# Patient Record
Sex: FEMALE | Race: BLACK OR AFRICAN AMERICAN | NOT HISPANIC OR LATINO | URBAN - METROPOLITAN AREA
[De-identification: names, ages, dates, MRNs, and addresses within clinical notes are randomized per-mention and may not be internally consistent; named-entity substitution may affect disease eponyms.]

---

## 2018-04-15 ENCOUNTER — EMERGENCY (EMERGENCY)
Facility: HOSPITAL | Age: 38
LOS: 0 days | Discharge: ANOTHER HOSPITAL | End: 2018-04-15
Attending: EMERGENCY MEDICINE
Payer: MEDICARE

## 2018-04-15 ENCOUNTER — INPATIENT (INPATIENT)
Facility: HOSPITAL | Age: 38
LOS: 9 days | Discharge: HOME | DRG: 57 | End: 2018-04-25
Attending: PSYCHIATRY & NEUROLOGY | Admitting: PSYCHIATRY & NEUROLOGY
Payer: MEDICARE

## 2018-04-15 VITALS
TEMPERATURE: 98 F | WEIGHT: 160.06 LBS | RESPIRATION RATE: 16 BRPM | OXYGEN SATURATION: 100 % | HEART RATE: 82 BPM | SYSTOLIC BLOOD PRESSURE: 117 MMHG | DIASTOLIC BLOOD PRESSURE: 78 MMHG | HEIGHT: 67 IN

## 2018-04-15 VITALS
HEART RATE: 76 BPM | SYSTOLIC BLOOD PRESSURE: 126 MMHG | RESPIRATION RATE: 18 BRPM | OXYGEN SATURATION: 100 % | DIASTOLIC BLOOD PRESSURE: 81 MMHG

## 2018-04-15 VITALS
SYSTOLIC BLOOD PRESSURE: 117 MMHG | OXYGEN SATURATION: 100 % | DIASTOLIC BLOOD PRESSURE: 84 MMHG | RESPIRATION RATE: 18 BRPM | HEART RATE: 84 BPM | TEMPERATURE: 98 F

## 2018-04-15 DIAGNOSIS — Z98.1 ARTHRODESIS STATUS: Chronic | ICD-10-CM

## 2018-04-15 DIAGNOSIS — R53.1 WEAKNESS: ICD-10-CM

## 2018-04-15 DIAGNOSIS — M25.512 PAIN IN LEFT SHOULDER: ICD-10-CM
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
## 2018-04-15 DIAGNOSIS — S14.109A UNSPECIFIED INJURY AT UNSPECIFIED LEVEL OF CERVICAL SPINAL CORD, INITIAL ENCOUNTER: ICD-10-CM
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
## 2018-04-15 DIAGNOSIS — M54.2 CERVICALGIA: ICD-10-CM

## 2018-04-15 LAB
ALBUMIN SERPL ELPH-MCNC: 3.7 G/DL — SIGNIFICANT CHANGE UP (ref 3.3–5)
ALP SERPL-CCNC: 90 U/L — SIGNIFICANT CHANGE UP (ref 40–120)
ALT FLD-CCNC: 14 U/L — SIGNIFICANT CHANGE UP (ref 12–78)
ANION GAP SERPL CALC-SCNC: 7 MMOL/L — SIGNIFICANT CHANGE UP (ref 5–17)
AST SERPL-CCNC: 19 U/L — SIGNIFICANT CHANGE UP (ref 15–37)
BASOPHILS # BLD AUTO: 0.11 K/UL — SIGNIFICANT CHANGE UP (ref 0–0.2)
BASOPHILS NFR BLD AUTO: 1.3 % — SIGNIFICANT CHANGE UP (ref 0–2)
BILIRUB SERPL-MCNC: 0.2 MG/DL — SIGNIFICANT CHANGE UP (ref 0.2–1.2)
BLD GP AB SCN SERPL QL: NEGATIVE — SIGNIFICANT CHANGE UP
BUN SERPL-MCNC: 12 MG/DL — SIGNIFICANT CHANGE UP (ref 7–23)
CALCIUM SERPL-MCNC: 8.8 MG/DL — SIGNIFICANT CHANGE UP (ref 8.5–10.1)
CHLORIDE SERPL-SCNC: 108 MMOL/L — SIGNIFICANT CHANGE UP (ref 96–108)
CO2 SERPL-SCNC: 24 MMOL/L — SIGNIFICANT CHANGE UP (ref 22–31)
CREAT SERPL-MCNC: 0.82 MG/DL — SIGNIFICANT CHANGE UP (ref 0.5–1.3)
CRP SERPL-MCNC: 0.2 MG/DL — SIGNIFICANT CHANGE UP (ref 0–0.4)
EOSINOPHIL # BLD AUTO: 0.33 K/UL — SIGNIFICANT CHANGE UP (ref 0–0.5)
EOSINOPHIL NFR BLD AUTO: 3.8 % — SIGNIFICANT CHANGE UP (ref 0–6)
GLUCOSE SERPL-MCNC: 83 MG/DL — SIGNIFICANT CHANGE UP (ref 70–99)
HCG SERPL-ACNC: <1 MIU/ML — SIGNIFICANT CHANGE UP
HCT VFR BLD CALC: 33.1 % — LOW (ref 34.5–45)
HGB BLD-MCNC: 10.1 G/DL — LOW (ref 11.5–15.5)
IMM GRANULOCYTES NFR BLD AUTO: 0.1 % — SIGNIFICANT CHANGE UP (ref 0–1.5)
LYMPHOCYTES # BLD AUTO: 2.3 K/UL — SIGNIFICANT CHANGE UP (ref 1–3.3)
LYMPHOCYTES # BLD AUTO: 26.3 % — SIGNIFICANT CHANGE UP (ref 13–44)
MCHC RBC-ENTMCNC: 26.6 PG — LOW (ref 27–34)
MCHC RBC-ENTMCNC: 30.5 GM/DL — LOW (ref 32–36)
MCV RBC AUTO: 87.3 FL — SIGNIFICANT CHANGE UP (ref 80–100)
MONOCYTES # BLD AUTO: 0.74 K/UL — SIGNIFICANT CHANGE UP (ref 0–0.9)
MONOCYTES NFR BLD AUTO: 8.4 % — SIGNIFICANT CHANGE UP (ref 2–14)
NEUTROPHILS # BLD AUTO: 5.27 K/UL — SIGNIFICANT CHANGE UP (ref 1.8–7.4)
NEUTROPHILS NFR BLD AUTO: 60.1 % — SIGNIFICANT CHANGE UP (ref 43–77)
PLATELET # BLD AUTO: 398 K/UL — SIGNIFICANT CHANGE UP (ref 150–400)
POTASSIUM SERPL-MCNC: 4.6 MMOL/L — SIGNIFICANT CHANGE UP (ref 3.5–5.3)
POTASSIUM SERPL-SCNC: 4.6 MMOL/L — SIGNIFICANT CHANGE UP (ref 3.5–5.3)
PROT SERPL-MCNC: 7.7 GM/DL — SIGNIFICANT CHANGE UP (ref 6–8.3)
RBC # BLD: 3.79 M/UL — LOW (ref 3.8–5.2)
RBC # FLD: 17.2 % — HIGH (ref 10.3–14.5)
RH IG SCN BLD-IMP: POSITIVE — SIGNIFICANT CHANGE UP
SODIUM SERPL-SCNC: 139 MMOL/L — SIGNIFICANT CHANGE UP (ref 135–145)
WBC # BLD: 8.76 K/UL — SIGNIFICANT CHANGE UP (ref 3.8–10.5)
WBC # FLD AUTO: 8.76 K/UL — SIGNIFICANT CHANGE UP (ref 3.8–10.5)

## 2018-04-15 PROCEDURE — 72125 CT NECK SPINE W/O DYE: CPT | Mod: 26

## 2018-04-15 PROCEDURE — 72141 MRI NECK SPINE W/O DYE: CPT | Mod: 26

## 2018-04-15 PROCEDURE — 70450 CT HEAD/BRAIN W/O DYE: CPT | Mod: 26,59

## 2018-04-15 PROCEDURE — 99291 CRITICAL CARE FIRST HOUR: CPT | Mod: GC

## 2018-04-15 PROCEDURE — 70496 CT ANGIOGRAPHY HEAD: CPT | Mod: 26

## 2018-04-15 PROCEDURE — 70551 MRI BRAIN STEM W/O DYE: CPT | Mod: 26

## 2018-04-15 PROCEDURE — 76376 3D RENDER W/INTRP POSTPROCES: CPT | Mod: 26

## 2018-04-15 PROCEDURE — 70498 CT ANGIOGRAPHY NECK: CPT | Mod: 26

## 2018-04-15 PROCEDURE — 99285 EMERGENCY DEPT VISIT HI MDM: CPT

## 2018-04-15 PROCEDURE — 93010 ELECTROCARDIOGRAM REPORT: CPT

## 2018-04-15 RX ORDER — MORPHINE SULFATE 50 MG/1
4 CAPSULE, EXTENDED RELEASE ORAL ONCE
Qty: 0 | Refills: 0 | Status: DISCONTINUED | OUTPATIENT
Start: 2018-04-15 | End: 2018-04-15

## 2018-04-15 RX ORDER — ACETAMINOPHEN 500 MG
1000 TABLET ORAL ONCE
Qty: 0 | Refills: 0 | Status: COMPLETED | OUTPATIENT
Start: 2018-04-15 | End: 2018-04-15

## 2018-04-15 RX ADMIN — MORPHINE SULFATE 4 MILLIGRAM(S): 50 CAPSULE, EXTENDED RELEASE ORAL at 18:00

## 2018-04-15 RX ADMIN — Medication 1000 MILLIGRAM(S): at 23:38

## 2018-04-15 RX ADMIN — Medication 400 MILLIGRAM(S): at 22:02

## 2018-04-15 NOTE — ED ADULT NURSE NOTE - CHPI ED SYMPTOMS NEG
no blurred vision/no fever/no nausea/no loss of consciousness/no change in level of consciousness/no dizziness/no confusion

## 2018-04-15 NOTE — ED PROVIDER NOTE - MEDICAL DECISION MAKING DETAILS
36 yo F s/p fall down steps with R leg and R arm weakness, concerning for SCIWORA  -basic labs, coags, type and screen, hcg, crp, ct brain and cs  -if labs and scans normal will likely TRN for suspect spinal cord injury

## 2018-04-15 NOTE — ED ADULT NURSE REASSESSMENT NOTE - NS ED NURSE REASSESS COMMENT FT1
patient remains a/o x4 in good spirits, c/o feeling cold and frequency of urination, right side of body ( hand and foot) not being able to move and hand is very cold. DR. Barton made aware. Emotional support given to patient and family. Cervical collar remains in place.

## 2018-04-15 NOTE — ED PROVIDER NOTE - PROGRESS NOTE DETAILS
Neurosurg consulted, will see pt. Attending MD Bush: seen by neurosurgery resident, recommends CTA head and neck to r/o vascular injury from fall that could have lead to CVA. Also plan for urgent MR brain and C spine. Attending MD Bush: radiology resident on call at [phone] aware of emergent need for MRI, he will arrange for this.

## 2018-04-15 NOTE — ED PROVIDER NOTE - MEDICAL DECISION MAKING DETAILS
transfer from [facility] s/p fall w/ R. sided weakness and decreased sensation for neurosurg eval, will consult neurosurg and follow recs including likely additional imaging, admission.

## 2018-04-15 NOTE — CONSULT NOTE ADULT - ASSESSMENT
37F with history of previous C3-5 ACDF p/w right hemiparesis and sensory loss after mechanical fall today. MRI brain and C-spine only shows mild right disc herniation without cord compression which does not explain patient's symptoms.     -No acute neurosurgical intervention indicated at this time  -Neurology eval  -neurochecks q4h  -No ligamentous injury on C-spine MRI, no need for [place] J collar   -Please re-consult neurosurgery as needed

## 2018-04-15 NOTE — ED ADULT TRIAGE NOTE - CHIEF COMPLAINT QUOTE
Right hip and right arm pain, fell down steps H/O plate in neck from prior fall with nerve damage on right side of body, No LOC

## 2018-04-15 NOTE — ED PROVIDER NOTE - OBJECTIVE STATEMENT
This is a 36 yo f w a pmhx of left shoulder surgery, cervical neck collar s/p fall c/o neck pain and right side weakness x 1 hour. She fell down 6 steps and hit the door. denies sob, nausea, vomiting, blood thinner, chest pain, dizziness.

## 2018-04-15 NOTE — CONSULT NOTE ADULT - ATTENDING COMMENTS
I reviewed the resident's note and agree. The patient is a 37-year-old female, with a history of a C3-C5 ACDF at an outside institution, who presents with right facial numbness and right upper and lower extremity numbness and weakness s/p fall. A CT and MRI of the patient's brain and cervical spine reveal no significant neurosurgical findings. As such, no acute neurosurgical intervention is indicated at this time. I saw and evaluated the patient. I reviewed the resident's note and agree. The patient is a 37-year-old female, with a history of a C3-C5 ACDF at an outside institution, who presents with right facial numbness and right upper and lower extremity numbness and weakness s/p fall. A CT and MRI of the patient's brain and cervical spine reveal no significant neurosurgical findings. As such, no acute neurosurgical intervention is indicated at this time.

## 2018-04-15 NOTE — ED PROVIDER NOTE - PHYSICAL EXAMINATION
Gen: NAD  Eyes:  sclerae white, no icterus  ENT: Moist mucous membranes. No exudates  Neck: supple, no LAD, mass or goiter, trachea midline  CV: RRR. Audible S1 and S2. No murmurs, rubs, gallops, S3, nor S4  Pulm: Clear to auscultation bilaterally. No wheezes, rales, or rhonchi  Abd: BS+, nondistended, No tenderness to palpation  Musculoskeletal:  No edema  Skin: no lesions or scars noted  Psych: mood good, affect full range and congruent with mood.  Neurologic: AAOx3, 4/5 strength RUE and RLE, decr'd sensation on R. side

## 2018-04-15 NOTE — ED PROVIDER NOTE - SHIFT CHANGE DETAILS
Pt pending completed neurology and neurosurgery consultation, MR results. Patient likely to be admitted to hospital given inability to ambulate

## 2018-04-15 NOTE — ED PROVIDER NOTE - ATTENDING CONTRIBUTION TO CARE
38 yo f w a s/p fall c/o r neck pain and right side weakness x 1 hour. She fell down 6 steps and hit the door. She also has L shoulder pain.  Pt. says the weakness happens "whenever she has an accident--she can't move the r side."  Pt. has no other complaints, no LOC.  Accident happened after slip and fall.    ROS: negative for fever, cough, headache, chest pain, shortness of breath, abd pain, nausea, vomiting, diarrhea, rash, and paresthesia.  PMH: left shoulder surgery 2012, r cervical plate s/p accident in 2009cervical neck collar, migraines, chronic neck and back pain; Meds: Denies; SH: Denies smoking/drinking/drug use   PHYSICAL: Vitals:   Gen: AAOx3, NAD, lying back in stretcher, uncomfortable, no movement of RUE or RLE  Head: ncat, perrla, eomi b/l  Neck: supple, no lymphadenopathy, no midline deviation  Heart: rrr, no m/r/g  Lungs: CTA b/l, no rales/ronchi/wheezes  Abd: soft, nontender, non-distended, no rebound or guarding  Ext: no clubbing/cyanosis/edema  Neuro: sensation intact b/l, RUE and RLE side has no movement against gravity, sensation intact throughout R side, CN2-12 intact b/l, no other deficits

## 2018-04-15 NOTE — ED PROVIDER NOTE - PROGRESS NOTE DETAILS
Pt place in cervical collar and laughing and talking to relative at bedside in nad called transfer center, pt. accepted by trauma sx Dr. Dumont  will do ED to ED transfer pt. will go to [facility] ER, Dr. Montilla is accepting ER

## 2018-04-15 NOTE — ED PROVIDER NOTE - OBJECTIVE STATEMENT
36 y/o F w/ hx of C3-C5 spinal fusion presents after mechanical fall around 1pm today down 6 stairs, now w/ weakness and decreased sensation on RUE and RLE, CT C-spine and CT head unremarkable, transferred from [facility] for neurosurg eval. 38 y/o F w/ hx of C3-C5 spinal fusion presents after mechanical fall around 1pm today down 6 stairs, now w/ weakness and decreased sensation on RUE and RLE, CT C-spine and CT head unremarkable, transferred from [facility] for neurosurg eval. DId not hit head, no LOC. 36 y/o F w/ hx of C3-C5 spinal fusion presents after mechanical fall around 1pm today down 6 stairs, now w/ weakness and decreased sensation on RUE and RLE, CT C-spine and CT head unremarkable, transferred from [facility] for neurosurg eval. DId not hit head, no LOC. Not on blood thinners.

## 2018-04-15 NOTE — ED ADULT NURSE NOTE - OBJECTIVE STATEMENT
Patient is 37 Y Female, PMH prior slip and fall in 2009 with resulting left sided weakness that resolved with PT, patient had C3-C5 fusion in 2011. Patient here today s/p mechanical fall down 3 steps on her knees landing on her right side at bottom. Denies LOC, hitting head, changing vision. Patient has been unable to walk since fall with RUE/RLE weakness, decreased sensation and minimal movement. Patient able to move right third digit minimally, no right toe/leg movement when instructed to. LUE/LLE WNL. Patient is c/o left hip and lower back pain after having MRI. States "Laying on my back for so long made my right hip hurt". Patient medicated with IV tylenol 1g as ordered. Family at bedside, patient in NAD. Safety maintained, C-collar in place. Neuro team to see patient at this time.

## 2018-04-15 NOTE — ED PROVIDER NOTE - CARE PLAN
Principal Discharge DX:	Cervical spinal cord injury without evidence of spinal bone injury, initial encounter

## 2018-04-15 NOTE — ED ADULT NURSE REASSESSMENT NOTE - NS ED NURSE REASSESS COMMENT FT1
patient c/o 6/10 right hip and chasidy pain prior to be transported, morphine 4mgs ivp given at 1800 as per DR. Barton with no noticeable adverse reactions.

## 2018-04-15 NOTE — CONSULT NOTE ADULT - SUBJECTIVE AND OBJECTIVE BOX
Pager: [phone]     HPI: 37F s/p C3-5 ACDF w/ Dr. Tang at outside hospital s/p mechanical today at 1pm with right sided weakness. Patient states that she noticed immediate right sided weakness after her fall today and came to [facility] where CT C-spine and brain were negative. She was subsequently transferred to [facility] for further evaluation. Patient denies incontinence but complains of mild headache, neck pain, weakness and loss of sensation on right.     PAST MEDICAL HISTORY     PAST SURGICAL HISTORY   s/p C3-5 ACDF    No Known Allergies    Home Medications:    REVIEW OF SYSTEMS:  Check here if all are normal other than Neurological []  General:  Eyes:  ENT:  Cardiac:  Respiratory:  GI:  Musculoskeletal: L hip pain  Skin:  Neurologic:   Psychiatric:     PHYSICAL EXAMINATION:   T(C): 36.7 (04-15-18 @ 18:54), Max: 36.7 (04-15-18 @ 18:54)  HR: 81 (04-15-18 @ 18:54) (76 - 81)  BP: 128/97 (04-15-18 @ 18:54) (126/81 - 128/97)  RR: 20 (04-15-18 @ 18:54) (18 - 20)  SpO2: 100% (04-15-18 @ 18:54) (100% - 100%)  Wt(kg): --    AOx3, Following Commands  CN: PERRL, EOMI, diminished sensation right V1-3, no facial droop appreciated, hearing grossly intact, palate elevation symmetric, tongue midline, shoulder shrug 5/5  Motor exam:          Upper extremity                         Delt     Bicep     Tricep    HG                                                 R         1/5        1/5        2/5       2/5                                               L          5/5        5/5        5/5       5/5          Lower extremity                        HF         KF        KE       DF         PF                                                  R        0/5        1/5        1/5       1/5         1/5                                               L         5/5        5/5       5/5       5/5          5/5                                                 Sensation: [] intact to light touch  [x] decreased: Right hemibody diminished light touch, pinprick and vibration  Reflexes: No clonus, No Moser or babinski  Gait: not assessed Pager: [phone]     HPI: 37F s/p C3-5 ACDF w/ Dr. Tang at outside hospital s/p mechanical today at 1pm with right sided weakness. Patient states that she noticed immediate right sided weakness after her fall today and came to [facility] where CT C-spine and brain were negative. She was subsequently transferred to [facility] for further evaluation. Patient denies incontinence but complains of mild headache, neck pain, weakness and loss of sensation on right.     PAST MEDICAL HISTORY     PAST SURGICAL HISTORY   s/p C3-5 ACDF    No Known Allergies    Home Medications:    REVIEW OF SYSTEMS:  Check here if all are normal other than Neurological []  General:  Eyes:  ENT:  Cardiac:  Respiratory:  GI:  Musculoskeletal: L hip pain  Skin:  Neurologic:   Psychiatric:     PHYSICAL EXAMINATION:   T(C): 36.7 (04-15-18 @ 18:54), Max: 36.7 (04-15-18 @ 18:54)  HR: 81 (04-15-18 @ 18:54) (76 - 81)  BP: 128/97 (04-15-18 @ 18:54) (126/81 - 128/97)  RR: 20 (04-15-18 @ 18:54) (18 - 20)  SpO2: 100% (04-15-18 @ 18:54) (100% - 100%)  Wt(kg): --    AOx3, Following Commands  CN: PERRL, EOMI, diminished sensation right V1-3, no facial droop appreciated, hearing grossly intact, palate elevation symmetric, tongue midline, shoulder shrug 5/5  Motor exam:          Upper extremity                         Delt     Bicep     Tricep    HG                                                 R         1/5        1/5        2/5       2/5                                               L          5/5        5/5        5/5       5/5          Lower extremity                        HF         KF        KE       DF         PF                                                  R        0/5        1/5        1/5       1/5         1/5                                               L         5/5        5/5       5/5       5/5          5/5                                                 Sensation: [] intact to light touch  [x] decreased: Right hemibody diminished light touch, pinprick and vibration  Reflexes: No clonus, No Moser or babinski  Gait: not assessed     Midline tenderness of C-spine    RADIOLOGY:    MRI brain: no abnormalities visualized on initial read  MRI Cervical spine: mild right C4-5 lateral/foraminal disc herniation without cord compression Pager: [phone]     HPI: 37F s/p C3-5 ACDF w/ Dr. Tang at outside hospital s/p mechanical fall today at 1pm with right sided weakness. Patient states that she noticed immediate right sided weakness after her fall today and came to [facility] where CT C-spine and brain were negative. She was subsequently transferred to [facility] for further evaluation. Patient denies incontinence but complains of mild headache, neck pain, weakness and loss of sensation on right.     PAST MEDICAL HISTORY     PAST SURGICAL HISTORY   s/p C3-5 ACDF    No Known Allergies    Home Medications:    REVIEW OF SYSTEMS:  Check here if all are normal other than Neurological []  General:  Eyes:  ENT:  Cardiac:  Respiratory:  GI:  Musculoskeletal: L hip pain  Skin:  Neurologic:   Psychiatric:     PHYSICAL EXAMINATION:   T(C): 36.7 (04-15-18 @ 18:54), Max: 36.7 (04-15-18 @ 18:54)  HR: 81 (04-15-18 @ 18:54) (76 - 81)  BP: 128/97 (04-15-18 @ 18:54) (126/81 - 128/97)  RR: 20 (04-15-18 @ 18:54) (18 - 20)  SpO2: 100% (04-15-18 @ 18:54) (100% - 100%)  Wt(kg): --    AOx3, Following Commands  CN: PERRL, EOMI, diminished sensation right V1-3, no facial droop appreciated, hearing grossly intact, palate elevation symmetric, tongue midline, shoulder shrug 5/5  Motor exam:          Upper extremity                         Delt     Bicep     Tricep    HG                                                 R         1/5        1/5        2/5       2/5                                               L          5/5        5/5        5/5       5/5          Lower extremity                        HF         KF        KE       DF         PF                                                  R        0/5        1/5        1/5       1/5         1/5                                               L         5/5        5/5       5/5       5/5          5/5                                                 Sensation: [] intact to light touch  [x] decreased: Right hemibody diminished light touch, pinprick and vibration  Reflexes: No clonus, No Moser or babinski  Gait: not assessed     Midline tenderness of C-spine    RADIOLOGY:    MRI brain: no abnormalities visualized on initial read  MRI Cervical spine: mild right C4-5 lateral/foraminal disc herniation without cord compression

## 2018-04-16 DIAGNOSIS — G81.91 HEMIPLEGIA, UNSPECIFIED AFFECTING RIGHT DOMINANT SIDE: ICD-10-CM

## 2018-04-16 LAB
ALBUMIN SERPL ELPH-MCNC: 4.1 G/DL — SIGNIFICANT CHANGE UP (ref 3.3–5)
ALP SERPL-CCNC: 73 U/L — SIGNIFICANT CHANGE UP (ref 40–120)
ALT FLD-CCNC: 7 U/L RC — LOW (ref 10–45)
ANION GAP SERPL CALC-SCNC: 11 MMOL/L — SIGNIFICANT CHANGE UP (ref 5–17)
APTT BLD: 24.2 SEC — LOW (ref 27.5–37.4)
AST SERPL-CCNC: 9 U/L — LOW (ref 10–40)
BILIRUB SERPL-MCNC: 0.2 MG/DL — SIGNIFICANT CHANGE UP (ref 0.2–1.2)
BUN SERPL-MCNC: 11 MG/DL — SIGNIFICANT CHANGE UP (ref 7–23)
CALCIUM SERPL-MCNC: 9.6 MG/DL — SIGNIFICANT CHANGE UP (ref 8.4–10.5)
CHLORIDE SERPL-SCNC: 103 MMOL/L — SIGNIFICANT CHANGE UP (ref 96–108)
CO2 SERPL-SCNC: 23 MMOL/L — SIGNIFICANT CHANGE UP (ref 22–31)
CREAT SERPL-MCNC: 0.8 MG/DL — SIGNIFICANT CHANGE UP (ref 0.5–1.3)
CRP SERPL-MCNC: 0.3 MG/DL — SIGNIFICANT CHANGE UP (ref 0–0.4)
ERYTHROCYTE [SEDIMENTATION RATE] IN BLOOD: 31 MM/HR — HIGH (ref 0–15)
GLUCOSE SERPL-MCNC: 92 MG/DL — SIGNIFICANT CHANGE UP (ref 70–99)
HCT VFR BLD CALC: 30 % — LOW (ref 34.5–45)
HGB BLD-MCNC: 9.4 G/DL — LOW (ref 11.5–15.5)
INR BLD: 0.99 RATIO — SIGNIFICANT CHANGE UP (ref 0.88–1.16)
MCHC RBC-ENTMCNC: 26.3 PG — LOW (ref 27–34)
MCHC RBC-ENTMCNC: 31.3 GM/DL — LOW (ref 32–36)
MCV RBC AUTO: 84 FL — SIGNIFICANT CHANGE UP (ref 80–100)
PLATELET # BLD AUTO: 397 K/UL — SIGNIFICANT CHANGE UP (ref 150–400)
POTASSIUM SERPL-MCNC: 3.5 MMOL/L — SIGNIFICANT CHANGE UP (ref 3.5–5.3)
POTASSIUM SERPL-SCNC: 3.5 MMOL/L — SIGNIFICANT CHANGE UP (ref 3.5–5.3)
PROT SERPL-MCNC: 7 G/DL — SIGNIFICANT CHANGE UP (ref 6–8.3)
PROTHROM AB SERPL-ACNC: 11.2 SEC — SIGNIFICANT CHANGE UP (ref 10–13.1)
RBC # BLD: 3.57 M/UL — LOW (ref 3.8–5.2)
RBC # FLD: 17.6 % — HIGH (ref 10.3–14.5)
SODIUM SERPL-SCNC: 137 MMOL/L — SIGNIFICANT CHANGE UP (ref 135–145)
WBC # BLD: 6.72 K/UL — SIGNIFICANT CHANGE UP (ref 3.8–10.5)
WBC # FLD AUTO: 6.72 K/UL — SIGNIFICANT CHANGE UP (ref 3.8–10.5)

## 2018-04-16 PROCEDURE — 99222 1ST HOSP IP/OBS MODERATE 55: CPT

## 2018-04-16 RX ORDER — ACETAMINOPHEN 500 MG
1000 TABLET ORAL ONCE
Qty: 0 | Refills: 0 | Status: COMPLETED | OUTPATIENT
Start: 2018-04-16 | End: 2018-04-16

## 2018-04-16 RX ORDER — TOPIRAMATE 25 MG
50 TABLET ORAL
Qty: 0 | Refills: 0 | Status: DISCONTINUED | OUTPATIENT
Start: 2018-04-16 | End: 2018-04-25

## 2018-04-16 RX ORDER — ACETAMINOPHEN 500 MG
325 TABLET ORAL EVERY 4 HOURS
Qty: 0 | Refills: 0 | Status: DISCONTINUED | OUTPATIENT
Start: 2018-04-16 | End: 2018-04-17

## 2018-04-16 RX ORDER — INFLUENZA VIRUS VACCINE 15; 15; 15; 15 UG/.5ML; UG/.5ML; UG/.5ML; UG/.5ML
0.5 SUSPENSION INTRAMUSCULAR ONCE
Qty: 0 | Refills: 0 | Status: DISCONTINUED | OUTPATIENT
Start: 2018-04-16 | End: 2018-04-25

## 2018-04-16 RX ORDER — ENOXAPARIN SODIUM 100 MG/ML
40 INJECTION SUBCUTANEOUS EVERY 24 HOURS
Qty: 0 | Refills: 0 | Status: DISCONTINUED | OUTPATIENT
Start: 2018-04-16 | End: 2018-04-25

## 2018-04-16 RX ORDER — KETOROLAC TROMETHAMINE 30 MG/ML
15 SYRINGE (ML) INJECTION ONCE
Qty: 0 | Refills: 0 | Status: DISCONTINUED | OUTPATIENT
Start: 2018-04-16 | End: 2018-04-16

## 2018-04-16 RX ADMIN — Medication 400 MILLIGRAM(S): at 20:21

## 2018-04-16 RX ADMIN — Medication 400 MILLIGRAM(S): at 03:17

## 2018-04-16 RX ADMIN — ENOXAPARIN SODIUM 40 MILLIGRAM(S): 100 INJECTION SUBCUTANEOUS at 18:35

## 2018-04-16 RX ADMIN — Medication 400 MILLIGRAM(S): at 14:56

## 2018-04-16 RX ADMIN — Medication 1000 MILLIGRAM(S): at 15:26

## 2018-04-16 RX ADMIN — Medication 15 MILLIGRAM(S): at 11:03

## 2018-04-16 RX ADMIN — Medication 50 MILLIGRAM(S): at 06:00

## 2018-04-16 RX ADMIN — Medication 1000 MILLIGRAM(S): at 03:23

## 2018-04-16 RX ADMIN — Medication 325 MILLIGRAM(S): at 09:31

## 2018-04-16 RX ADMIN — Medication 50 MILLIGRAM(S): at 18:35

## 2018-04-16 RX ADMIN — Medication 15 MILLIGRAM(S): at 11:33

## 2018-04-16 RX ADMIN — Medication 1000 MILLIGRAM(S): at 20:35

## 2018-04-16 NOTE — H&P ADULT - ASSESSMENT
Pt is a 38 yo F with a PMH of Migraine and C-Spine Fusion (s/p fall) who is now presenting as a transfer from [facility] after developing right sided weakness 2/2 fall down stairs. Pt was walking down the stairs, slipped, fell forward on her knees and landed with the front of her head into the door at the bottom of the stairs. Exam shows only mild b/l knee excoriations (no injury to the head) as well as a very inconsistent strength and sensory exam. CTA Head and Neck show no stenosis. MRI Head and C-Spine w/o (prelim) show no signs of stroke or cord compression.    Plan:  - Neuro checks q4hr  - Vitals q8hr  - PT/OT  - Fall Precautions  - c/w home meds  - c/w tylenol and ibuprofen prn pain  - Avoid use of opiates for pain control  - DVT ppx

## 2018-04-16 NOTE — PATIENT PROFILE ADULT. - MENTAL HEALTH CONDITIONS/SYMPTOMS, PROFILE
none
clitoris and vaginal anatomy normal, absent significant discharge or tags; no masses; no hernias.

## 2018-04-16 NOTE — H&P ADULT - HISTORY OF PRESENT ILLNESS
Pt is a 36 yo F with a PMH of Migraine and C-Spine Fusion (s/p fall) who is now presenting as a transfer from [facility] after developing right sided weakness 2/2 fall down stairs. Pt notes that she was walking down the stairs and slipped, fell forward on her knees and landed with the front of her head into the door at the bottom of the stairs. When she tried to get up she could not due to the right sided weakness and the left hip pain, associated is a right sided numbness. Pt denies any LOC from, shaking, urinary/bowel loss or tongue biting. Pt notes similar prior episode when she fell last time, only at that point her left side was weak, and only improved after spinal surgery. Pt denies any recent fevers, chills, HA, dizziness, SOB, CP, cough, nausea, vomiting, abdominal pain, changes in BMs/urination.

## 2018-04-16 NOTE — H&P ADULT - NSHPLABSRESULTS_GEN_ALL_CORE
Vital Signs Last 24 Hrs  T(C): 36.5 (16 Apr 2018 01:37), Max: 36.7 (15 Apr 2018 18:54)  T(F): 97.7 (16 Apr 2018 01:37), Max: 98.1 (15 Apr 2018 18:54)  HR: 86 (16 Apr 2018 01:37) (76 - 86)  BP: 105/68 (16 Apr 2018 01:37) (105/68 - 128/97)  BP(mean): --  RR: 16 (16 Apr 2018 01:37) (16 - 20)  SpO2: 100% (16 Apr 2018 01:37) (100% - 100%)  Neurology Follow up note      MEDICATIONS  (STANDING):  topiramate 50 milliGRAM(s) Oral two times a day    MEDICATIONS  (PRN):      Radiology:  < from: CT Angio Head w/ IV Cont (04.15.18 @ 19:35) >    IMPRESSION:    Unremarkable CTA of the head and neck. No evidence for carotid or   vertebral dissection.

## 2018-04-16 NOTE — PATIENT PROFILE ADULT. - NS PRO PT RIGHT SUPPORT PERSON
----- Message from Franco Petersen MD sent at 4/25/2017  2:20 PM CDT -----  Please call family with these results. Total cholesterol and LDL cholesterol or borderline high. Recommend increased exercise and lower fat, lower cholesterol diet. Labs should be rechecked in one year.   same name as above

## 2018-04-16 NOTE — H&P ADULT - NSHPPHYSICALEXAM_GEN_ALL_CORE
General Exam:   General appearance: No acute distress, comfortable appearing, detached affect, in J-Collar  Skin: No signs of trauma on the head, mild excoriations of the b/l knees  Neurological Exam:  Mental Status: Orientated to self, date and place.  Attention intact.  No dysarthria. Speech fluent.  Cranial Nerves: PERRL, EOMI, VFF, no nystagmus.    CN V1-3 intact to light touch b/l.  No facial asymmetry.  Hearing intact to finger rub bilaterally.  Tongue, uvula and palate midline.  Sternocleidomastoid and Trapezius intact bilaterally.    Motor:   Tone: normal.  Strength: At least 4/5 in the RUE, 1/5 in the RLE, 5/5 in the LUE and LLE (exam very effort dependent)  Drift: RUE slow drift  Dysmetria: None to finger-nose-finger on the left  No truncal ataxia.    Tremor: No resting, postural or action tremor.  No myoclonus.    Sensation: intact to temp throughout, inconsistent to LT and pinprick throughout    Deep Tendon Reflexes:               Biceps        BR        Patellar        Ankle       Babinski                                         R        2+             1+        1+               1+           mute                                         L         2+             1+        1+               1+           downgoing

## 2018-04-17 PROCEDURE — 95819 EEG AWAKE AND ASLEEP: CPT | Mod: 26

## 2018-04-17 PROCEDURE — 99232 SBSQ HOSP IP/OBS MODERATE 35: CPT

## 2018-04-17 PROCEDURE — 71045 X-RAY EXAM CHEST 1 VIEW: CPT | Mod: 26

## 2018-04-17 RX ORDER — ACETAMINOPHEN 500 MG
1000 TABLET ORAL ONCE
Qty: 0 | Refills: 0 | Status: COMPLETED | OUTPATIENT
Start: 2018-04-17 | End: 2018-04-17

## 2018-04-17 RX ORDER — IBUPROFEN 200 MG
600 TABLET ORAL EVERY 8 HOURS
Qty: 0 | Refills: 0 | Status: DISCONTINUED | OUTPATIENT
Start: 2018-04-17 | End: 2018-04-19

## 2018-04-17 RX ORDER — ACETAMINOPHEN 500 MG
1000 TABLET ORAL EVERY 8 HOURS
Qty: 0 | Refills: 0 | Status: DISCONTINUED | OUTPATIENT
Start: 2018-04-17 | End: 2018-04-25

## 2018-04-17 RX ADMIN — ENOXAPARIN SODIUM 40 MILLIGRAM(S): 100 INJECTION SUBCUTANEOUS at 18:35

## 2018-04-17 RX ADMIN — Medication 1000 MILLIGRAM(S): at 12:56

## 2018-04-17 RX ADMIN — Medication 1000 MILLIGRAM(S): at 18:59

## 2018-04-17 RX ADMIN — Medication 400 MILLIGRAM(S): at 18:29

## 2018-04-17 RX ADMIN — Medication 400 MILLIGRAM(S): at 02:21

## 2018-04-17 RX ADMIN — Medication 400 MILLIGRAM(S): at 12:26

## 2018-04-17 RX ADMIN — Medication 50 MILLIGRAM(S): at 18:30

## 2018-04-17 RX ADMIN — Medication 1000 MILLIGRAM(S): at 23:12

## 2018-04-17 RX ADMIN — Medication 1000 MILLIGRAM(S): at 02:35

## 2018-04-17 RX ADMIN — Medication 50 MILLIGRAM(S): at 05:56

## 2018-04-17 NOTE — PHYSICAL THERAPY INITIAL EVALUATION ADULT - BALANCE TRAINING, PT EVAL
GOAL: Pt will demonstrate improved static/dynamic standing balance to good, in order to improve stability, decrease fall risk and increase independence with transfers & ambulation within 2 weeks.

## 2018-04-17 NOTE — CHART NOTE - NSCHARTNOTEFT_GEN_A_CORE
ZACK HUTCHINSON  37y  Female  PAST MEDICAL & SURGICAL HISTORY:  No pertinent past medical history  S/P cervical spinal fusion: C3-C5  No significant past surgical history    Patient is a 37y old  Female who presents with a chief complaint of weakness (16 Apr 2018 01:41)    Patient seen and examined. Patient complaining of left hip pain since after the  fall at home. X-Ray left hip ordered. Tylenol given for pain. Will continue to monitor pt.    Vital Signs Last 24 Hrs  T(C): 36.6 (17 Apr 2018 01:31), Max: 36.8 (16 Apr 2018 09:37)  T(F): 97.8 (17 Apr 2018 01:31), Max: 98.2 (16 Apr 2018 09:37)  HR: 70 (17 Apr 2018 01:31) (70 - 88)  BP: 100/61 (17 Apr 2018 01:31) (97/65 - 110/76)  BP(mean): --  RR: 18 (17 Apr 2018 01:31) (18 - 18)  SpO2: 100% (17 Apr 2018 01:31) (98% - 100%)                        9.4    6.72  )-----------( 397      ( 16 Apr 2018 09:26 )             30.0     04-16    137  |  103  |  11  ----------------------------<  92  3.5   |  23  |  0.80    Ca    9.6      16 Apr 2018 07:14    TPro  7.0  /  Alb  4.1  /  TBili  0.2  /  DBili  x   /  AST  9<L>  /  ALT  7<L>  /  AlkPhos  73  04-16

## 2018-04-17 NOTE — PHYSICAL THERAPY INITIAL EVALUATION ADULT - CRITERIA FOR SKILLED THERAPEUTIC INTERVENTIONS
impairments found/functional limitations in following categories/therapy frequency/predicted duration of therapy intervention/risk reduction/prevention/rehab potential/anticipated discharge recommendation

## 2018-04-17 NOTE — PHYSICAL THERAPY INITIAL EVALUATION ADULT - PRECAUTIONS/LIMITATIONS, REHAB EVAL
spinal precautions/CTA Head and Neck show no stenosis. MRI Head and C-Spine w/o (prelim) show no signs of stroke or cord compression./fall precautions

## 2018-04-17 NOTE — PHYSICAL THERAPY INITIAL EVALUATION ADULT - PERTINENT HX OF CURRENT PROBLEM, REHAB EVAL
36 yo F with a PMH of Migraine and C-Spine Fusion (s/p fall) who is now presenting as a transfer from [facility] after developing right sided weakness 2/2 fall down stairs. Pt was walking down the stairs, slipped, fell forward on her knees and landed with the front of her head into the door at the bottom of the stairs. Exam shows only mild b/l knee excoriations (no injury to the head) as well as a very inconsistent strength and sensory exam.

## 2018-04-17 NOTE — EEG REPORT - NS EEG TEXT BOX
ZACK HUTCHINSON [id number]     Study Date: 		04-16-18    ROUTINE EEG    Technical Information:			  		  Placement and Labeling of Electrodes:  The EEG was performed utilizing 20 channels referential EEG connections (coronal over temporal over parasagittal montage) using all standard 10-20 electrode placements with EKG.  Recording was at a sampling rate of 256 samples per second per channel.  Time synchronized digital video recording was done simultaneously with EEG recording.  A low light infrared camera was used for low light recording.  Misael and seizure detection algorithms were utilized.    CSA Technical Component:  Quantitative EEG analysis using a separate Compressed Spectral Array (CSA) software package was conducted in real-time and run at bedside after set up by the technician, digitally displaying the power of electrographic frequencies included in the 1-30Hz band using a graded color map.  This data was reviewed and interpreted independently, and is reported in a separate section below.    --------------------------------------------------------------------------------------------------  History:  CC/ HPI Patient is a 37y old  Female who presents with a chief complaint of weakness (16 Apr 2018 01:41)    MEDICATIONS  (STANDING):  enoxaparin Injectable 40 milliGRAM(s) SubCutaneous every 24 hours  influenza   Vaccine 0.5 milliLiter(s) IntraMuscular once  topiramate 50 milliGRAM(s) Oral two times a day    --------------------------------------------------------------------------------------------------  Study Interpretation:    FINDINGS:  The background was continuous, spontaneously variable and reactive.  During wakefulness, the posteriorly dominant rhythm consisted of symmetric, well modulated 10.5 Hz activity, with an amplitude to 30 uV, that attenuated to eye opening.  Low amplitude central beta was noted in wakefulness.    Background Slowing:  Generalized slowing: none was present.  Focal slowing: none was present.    Sleep Background:  Drowsiness was characterized by fragmentation, attenuation, and slowing of the background activity.  Shifting theta temporally over the temporal regions.  Sleep was characterized by the presence of vertex waves, symmetric spindles, and K-complexes.    Epileptiform Activity:   No epileptiform discharges were present.    Events:  No clinical events were recorded.  No seizures were recorded.    Activation Procedures:   -Hyperventilation was not performed.    -Photic stimulation was not performed.    Artifacts:  Intermittent myogenic and movement artifacts were noted.    ECG:  The heart rate on single channel ECG was predominantly between 60-70BPM.  -----------------------------------------------------------------------------------------------------    EEG Classification / Summary:  Normal EEG Study   -awake and asleep  -----------------------------------------------------------------------------------------------------    Clinical Impression:  There were no epileptiform abnormalities recorded.      -------------------------------------------------------------------------------------------------------  Vishal Krishna M.D.   of Neurology, [facility]

## 2018-04-17 NOTE — PHYSICAL THERAPY INITIAL EVALUATION ADULT - GAIT TRAINING, PT EVAL
GOAL: Pt will ambulate 100 feet w/ contact guard assist, w/use of appropriate assistive device in 2 weeks.

## 2018-04-17 NOTE — PHYSICAL THERAPY INITIAL EVALUATION ADULT - STRENGTHENING, PT EVAL
GOAL: Pt will improve RLE strength to at least 3/5, for increased limb stability, to improve gait and facilitate stair negotiation in 2 weeks.

## 2018-04-17 NOTE — PHYSICAL THERAPY INITIAL EVALUATION ADULT - LIVES WITH, PROFILE
spouse/pt reports she lives in apartment with spouse, 4 stairs to entrance then additional 16 stairs to apartment +HR.  Patient reports independent in functional mobility prior to admission.

## 2018-04-17 NOTE — PROGRESS NOTE ADULT - SUBJECTIVE AND OBJECTIVE BOX
Neurology Consult Note    Interval history  - No overnight events, reports hip pain.     Vital Signs Last 24 Hrs  T(C): 36.5 (17 Apr 2018 13:45), Max: 36.9 (17 Apr 2018 05:17)  T(F): 97.7 (17 Apr 2018 13:45), Max: 98.4 (17 Apr 2018 05:17)  HR: 80 (17 Apr 2018 13:45) (70 - 88)  BP: 100/67 (17 Apr 2018 13:45) (94/62 - 110/76)  BP(mean): --  RR: 18 (17 Apr 2018 13:45) (18 - 18)  SpO2: 95% (17 Apr 2018 13:45) (95% - 100%)    Neurological Exam:  MS - Neurology Consult Note    Interval history  - No overnight events, reports hip pain.     Vital Signs Last 24 Hrs  T(C): 36.5 (17 Apr 2018 13:45), Max: 36.9 (17 Apr 2018 05:17)  T(F): 97.7 (17 Apr 2018 13:45), Max: 98.4 (17 Apr 2018 05:17)  HR: 80 (17 Apr 2018 13:45) (70 - 88)  BP: 100/67 (17 Apr 2018 13:45) (94/62 - 110/76)  BP(mean): --  RR: 18 (17 Apr 2018 13:45) (18 - 18)  SpO2: 95% (17 Apr 2018 13:45) (95% - 100%)    	Neurological Exam:  	Mental Status: Orientated to self, date and place.  Attention intact.  No dysarthria. Speech fluent.  	Cranial Nerves: PERRL, EOMI, VFF, no nystagmus.    CN V1-3 intact to light touch b/l.  No facial asymmetry.  Hearing intact to finger rub bilaterally.  Tongue, uvula and palate midline.  Sternocleidomastoid and Trapezius intact bilaterally.    	Motor:   	Tone: normal.  	Strength:  0/5 in the RUE, 0/5 in the RLE, 5/5 in the LUE and LLE  	Drift: RUE slow drift  	Dysmetria: None to finger-nose-finger on the left  	No truncal ataxia.    	Tremor: No resting, postural or action tremor.  No myoclonus.    	Sensation: intact to temp throughout, inconsistent to LT and pinprick throughout    	Deep Tendon Reflexes:               Biceps        BR        Patellar        Ankle       Babinski  	                                       R        2+             1+        1+               1+           mute                                         L         2+             1+        1+               1+           down    Labs                        9.4    6.72  )-----------( 397      ( 16 Apr 2018 09:26 )             30.0   04-16    137  |  103  |  11  ----------------------------<  92  3.5   |  23  |  0.80    Ca    9.6      16 Apr 2018 07:14    TPro  7.0  /  Alb  4.1  /  TBili  0.2  /  DBili  x   /  AST  9<L>  /  ALT  7<L>  /  AlkPhos  73  04-16    PT/INR - ( 16 Apr 2018 09:27 )   PT: 11.2 sec;   INR: 0.99 ratio         PTT - ( 16 Apr 2018 09:27 )  PTT:24.2 sec    Imaging    Unremarkable CTA of the head and neck. No evidence for carotid or   vertebral dissection.    MRI of the brain was performed using sagittal T1, axial SPGR, T2, FLAIR   diffusion and susceptibility weighted sequence.The lateral ventricles have a normal configuration and There is no evidence of acute hemorrhage, mass mass effect or abnormal signal in posterior fossa supratentorial regioned.  Impression: No evidence of acute hemorrhage, mass or mass effect.    MRI cervical spine There is evidence of postop changes compatible with anterior cervical   spine fusion. This extends from C3 to C5. The metallic hardware does   appear to be adequately placed.    C2-3: Disc bulge is identified. There is abnormal T2 prolongation   associated this disc bulge which could be compatible with underlying   annular tear. Clinical correlation continued close interval follow-up is   recommended.    C3-4: Normal.     C4-5: Hypertrophic change is seen involving the right uncovertebral   joint. Mild to moderate narrowing of the right neural foramen is seen.    C5-6: Disc bulge and bilateral hypertrophic facet joint changes seen. No   significant, minus of the spinal canal or either neural foramen.    C6-7: Normal    C7-T1: Normal    Spinal cord demonstrates normal signal and caliber.    Postop changes are identified.    Degenerative changes as described.    Disc bulge is seen at the C2-3 level with associated T2 prolongation as   described above.

## 2018-04-17 NOTE — PROGRESS NOTE ADULT - ASSESSMENT
Pt is a 38 yo F with a PMH of Migraine and C-Spine Fusion (s/p fall) who is now presenting as a transfer from [place] after developing right sided weakness 2/2 fall down stairs. On exam pt has significant right sided weakness which is possibly effort dependent. MRI brain and MRA C spine are negative for any pathology. rEEG negative for seizure events.     Right sided hemiparesis s/p fall    Plan:  Pt/OT - acute rehab  FU Hip xray - still pending   SW/CM follow up regarding placement

## 2018-04-17 NOTE — PHYSICAL THERAPY INITIAL EVALUATION ADULT - TRANSFER TRAINING, PT EVAL
GOAL: Pt will perform ALL transfers contact guard assist, w/use of appropriate assistive device as needed, in 2 weeks.

## 2018-04-18 PROCEDURE — 99232 SBSQ HOSP IP/OBS MODERATE 35: CPT

## 2018-04-18 PROCEDURE — 73502 X-RAY EXAM HIP UNI 2-3 VIEWS: CPT | Mod: 26,LT

## 2018-04-18 RX ORDER — KETOROLAC TROMETHAMINE 30 MG/ML
15 SYRINGE (ML) INJECTION ONCE
Qty: 0 | Refills: 0 | Status: DISCONTINUED | OUTPATIENT
Start: 2018-04-18 | End: 2018-04-18

## 2018-04-18 RX ADMIN — ENOXAPARIN SODIUM 40 MILLIGRAM(S): 100 INJECTION SUBCUTANEOUS at 17:16

## 2018-04-18 RX ADMIN — Medication 50 MILLIGRAM(S): at 17:15

## 2018-04-18 RX ADMIN — Medication 15 MILLIGRAM(S): at 14:23

## 2018-04-18 RX ADMIN — Medication 1000 MILLIGRAM(S): at 17:17

## 2018-04-18 RX ADMIN — Medication 600 MILLIGRAM(S): at 11:00

## 2018-04-18 RX ADMIN — Medication 600 MILLIGRAM(S): at 10:32

## 2018-04-18 RX ADMIN — Medication 1000 MILLIGRAM(S): at 09:03

## 2018-04-18 RX ADMIN — Medication 15 MILLIGRAM(S): at 14:47

## 2018-04-18 RX ADMIN — Medication 50 MILLIGRAM(S): at 05:53

## 2018-04-18 NOTE — OCCUPATIONAL THERAPY INITIAL EVALUATION ADULT - ADDITIONAL COMMENTS
Pt denies any LOC from, shaking, urinary/bowel loss or tongue biting. Pt notes similar prior episode when she fell last time, only at that point her left side was weak, and only improved after spinal surgery.

## 2018-04-18 NOTE — PROGRESS NOTE ADULT - SUBJECTIVE AND OBJECTIVE BOX
Neurology Consult Note    Interval history  - No overnight events, reports hip pain.     Vital Signs Last 24 Hrs  T(C): 36.5 (17 Apr 2018 13:45), Max: 36.9 (17 Apr 2018 05:17)  T(F): 97.7 (17 Apr 2018 13:45), Max: 98.4 (17 Apr 2018 05:17)  HR: 80 (17 Apr 2018 13:45) (70 - 88)  BP: 100/67 (17 Apr 2018 13:45) (94/62 - 110/76)  BP(mean): --  RR: 18 (17 Apr 2018 13:45) (18 - 18)  SpO2: 95% (17 Apr 2018 13:45) (95% - 100%)    	Neurological Exam:  	Mental Status: Orientated to self, date and place.  Attention intact.  No dysarthria. Speech fluent.  	Cranial Nerves: PERRL, EOMI, VFF, no nystagmus.    CN V1-3 intact to light touch b/l.  No facial asymmetry.  Hearing intact to finger rub bilaterally.  Tongue, uvula and palate midline.  Sternocleidomastoid and Trapezius intact bilaterally.    	Motor:   	Tone: normal.  	Strength:  0/5 in the RUE, 0/5 in the RLE, 5/5 in the LUE and LLE  	Drift: RUE slow drift  	Dysmetria: None to finger-nose-finger on the left  	No truncal ataxia.    	Tremor: No resting, postural or action tremor.  No myoclonus.    	Sensation: intact to temp throughout, inconsistent to LT and pinprick throughout    	Deep Tendon Reflexes:               Biceps        BR        Patellar        Ankle       Babinski  	                                       R        2+             1+        1+               1+           mute                                         L         2+             1+        1+               1+           down    Labs                        9.4    6.72  )-----------( 397      ( 16 Apr 2018 09:26 )             30.0   04-16    137  |  103  |  11  ----------------------------<  92  3.5   |  23  |  0.80    Ca    9.6      16 Apr 2018 07:14    TPro  7.0  /  Alb  4.1  /  TBili  0.2  /  DBili  x   /  AST  9<L>  /  ALT  7<L>  /  AlkPhos  73  04-16    PT/INR - ( 16 Apr 2018 09:27 )   PT: 11.2 sec;   INR: 0.99 ratio         PTT - ( 16 Apr 2018 09:27 )  PTT:24.2 sec    Imaging    Unremarkable CTA of the head and neck. No evidence for carotid or   vertebral dissection.    MRI of the brain was performed using sagittal T1, axial SPGR, T2, FLAIR   diffusion and susceptibility weighted sequence.The lateral ventricles have a normal configuration and There is no evidence of acute hemorrhage, mass mass effect or abnormal signal in posterior fossa supratentorial regioned.  Impression: No evidence of acute hemorrhage, mass or mass effect.    MRI cervical spine There is evidence of postop changes compatible with anterior cervical   spine fusion. This extends from C3 to C5. The metallic hardware does   appear to be adequately placed.    C2-3: Disc bulge is identified. There is abnormal T2 prolongation   associated this disc bulge which could be compatible with underlying   annular tear. Clinical correlation continued close interval follow-up is   recommended.    C3-4: Normal.     C4-5: Hypertrophic change is seen involving the right uncovertebral   joint. Mild to moderate narrowing of the right neural foramen is seen.    C5-6: Disc bulge and bilateral hypertrophic facet joint changes seen. No   significant, minus of the spinal canal or either neural foramen.    C6-7: Normal    C7-T1: Normal    Spinal cord demonstrates normal signal and caliber.    Postop changes are identified.    Degenerative changes as described.    Disc bulge is seen at the C2-3 level with associated T2 prolongation as   described above. Neurology Consult Note    Interval history  - Still complaining of left hip pain. Was unable to tolerate 2 attempts of hip xray.  Also complaining of neck pain.     MEDICATIONS  (STANDING):  enoxaparin Injectable 40 milliGRAM(s) SubCutaneous every 24 hours  influenza   Vaccine 0.5 milliLiter(s) IntraMuscular once  topiramate 50 milliGRAM(s) Oral two times a day    MEDICATIONS  (PRN):  acetaminophen   Tablet 1000 milliGRAM(s) Oral every 8 hours PRN severe pain  ibuprofen  Tablet 600 milliGRAM(s) Oral every 8 hours PRN moderate pain    Vital Signs Last 24 Hrs  Vital Signs Last 24 Hrs  T(C): 36.7 (18 Apr 2018 08:54), Max: 36.7 (17 Apr 2018 16:55)  T(F): 98.1 (18 Apr 2018 08:54), Max: 98.1 (18 Apr 2018 04:41)  HR: 100 (18 Apr 2018 08:54) (76 - 100)  BP: 100/66 (18 Apr 2018 08:54) (94/64 - 106/71)  BP(mean): --  RR: 18 (18 Apr 2018 08:54) (18 - 18)  SpO2: 100% (18 Apr 2018 08:54) (95% - 100%)    	Neurological Exam:  	Mental Status: Orientated to self, date and place.  Attention intact.  No dysarthria. Speech fluent.  	Cranial Nerves: PERRL, EOMI, VFF, no nystagmus.    CN V1-3 intact to light touch b/l.  No facial asymmetry.  Hearing intact to finger rub bilaterally.  Tongue, uvula and palate midline.  Sternocleidomastoid and Trapezius intact bilaterally.    	Motor:   	Tone: normal.  	Strength:  0/5 in the RUE, 0/5 in the RLE, 5/5 in the LUE and LLE  	Drift: RUE slow drift  	Dysmetria: None to finger-nose-finger on the left  	No truncal ataxia.    	Tremor: No resting, postural or action tremor.  No myoclonus.    	Sensation: intact to temp throughout, inconsistent to LT and pinprick throughout    	Deep Tendon Reflexes:               Biceps        BR        Patellar        Ankle       Babinski  	                                       R        2+             1+        1+               1+           mute                                         L         2+             1+        1+               1+           down    Labs      Imaging    < from: Xray Hip 2-3 Views, Left (04.18.18 @ 11:15) >  FINDINGS/  IMPRESSION:    There is no acute fracture or dislocation of the left hip. Normal bone   mineralization and osseous trabecular pattern. The joint spaces are   maintained.     < end of copied text >      Unremarkable CTA of the head and neck. No evidence for carotid or   vertebral dissection.    MRI of the brain was performed using sagittal T1, axial SPGR, T2, FLAIR   diffusion and susceptibility weighted sequence.The lateral ventricles have a normal configuration and There is no evidence of acute hemorrhage, mass mass effect or abnormal signal in posterior fossa supratentorial regioned.  Impression: No evidence of acute hemorrhage, mass or mass effect.    MRI cervical spine There is evidence of postop changes compatible with anterior cervical   spine fusion. This extends from C3 to C5. The metallic hardware does   appear to be adequately placed.    C2-3: Disc bulge is identified. There is abnormal T2 prolongation   associated this disc bulge which could be compatible with underlying   annular tear. Clinical correlation continued close interval follow-up is   recommended.    C3-4: Normal.     C4-5: Hypertrophic change is seen involving the right uncovertebral   joint. Mild to moderate narrowing of the right neural foramen is seen.    C5-6: Disc bulge and bilateral hypertrophic facet joint changes seen. No   significant, minus of the spinal canal or either neural foramen.    C6-7: Normal    C7-T1: Normal    Spinal cord demonstrates normal signal and caliber.    Postop changes are identified.    Degenerative changes as described.    Disc bulge is seen at the C2-3 level with associated T2 prolongation as   described above.

## 2018-04-18 NOTE — OCCUPATIONAL THERAPY INITIAL EVALUATION ADULT - PERTINENT HX OF CURRENT PROBLEM, REHAB EVAL
36 yo F with a PMH of Migraine & C-Spine Fusion who is now presenting as a transfer from [facility] after developing R sided weakness 2/2 fall down stairs. Pt notes that she was walking down the stairs and slipped, fell forward on her knees and landed with the front of her head into the door at the bottom of the stairs. When she tried to get up she could not due to the right sided weakness and the left hip pain, associated is a right sided numbness.

## 2018-04-18 NOTE — OCCUPATIONAL THERAPY INITIAL EVALUATION ADULT - RANGE OF MOTION EXAMINATION, UPPER EXTREMITY
A/aarom of RUE inconsistent. Pt able to bear weight and hold walker with right UE.  WHen Righr shoulder flexed and the released, pt was able to control movement ./Left UE Active ROM was WNL (within normal limits)

## 2018-04-18 NOTE — OCCUPATIONAL THERAPY INITIAL EVALUATION ADULT - FINE MOTOR COORDINATION, FINE MOTOR COORDINATION TESTS, OT EVAL
pt unable to use right hand to complete any functional activities however when ambulating pt was able to maintain  on walker.

## 2018-04-18 NOTE — PROGRESS NOTE ADULT - ASSESSMENT
Pt is a 36 yo F with a PMH of Migraine and C-Spine Fusion (s/p fall) who is now presenting as a transfer from [facility] after developing right sided weakness 2/2 fall down stairs. On exam pt has significant right sided weakness which is possibly effort dependent. MRI brain and MRA C spine are negative for any pathology. rEEG negative for seizure events.     Right sided hemiparesis s/p fall    Plan:  Pt/OT - acute rehab  FU Hip xray - still pending   SW/CM follow up regarding placement Pt is a 36 yo F with a PMH of Migraine and C-Spine Fusion (s/p fall) who is now presenting as a transfer from [facility] after developing right sided weakness 2/2 fall down stairs. On exam pt has significant right sided weakness which is possibly effort dependent. MRI brain and MRA C spine are negative for any pathology. rEEG negative for seizure events.     Right sided hemiparesis s/p fall  Left hip pain, xray negative    Plan:  Pain control, toradol for breakthrough  Pt/OT - acute rehab  SW/CM follow up regarding placement

## 2018-04-19 ENCOUNTER — TRANSCRIPTION ENCOUNTER (OUTPATIENT)
Age: 38
End: 2018-04-19

## 2018-04-19 LAB — CK SERPL-CCNC: 60 U/L — SIGNIFICANT CHANGE UP (ref 25–170)

## 2018-04-19 PROCEDURE — 99232 SBSQ HOSP IP/OBS MODERATE 35: CPT

## 2018-04-19 PROCEDURE — 99221 1ST HOSP IP/OBS SF/LOW 40: CPT

## 2018-04-19 RX ORDER — KETOROLAC TROMETHAMINE 30 MG/ML
15 SYRINGE (ML) INJECTION EVERY 8 HOURS
Qty: 0 | Refills: 0 | Status: DISCONTINUED | OUTPATIENT
Start: 2018-04-19 | End: 2018-04-21

## 2018-04-19 RX ORDER — PANTOPRAZOLE SODIUM 20 MG/1
40 TABLET, DELAYED RELEASE ORAL
Qty: 0 | Refills: 0 | Status: DISCONTINUED | OUTPATIENT
Start: 2018-04-19 | End: 2018-04-25

## 2018-04-19 RX ORDER — ACETAMINOPHEN 500 MG
1000 TABLET ORAL ONCE
Qty: 0 | Refills: 0 | Status: COMPLETED | OUTPATIENT
Start: 2018-04-19 | End: 2018-04-19

## 2018-04-19 RX ADMIN — ENOXAPARIN SODIUM 40 MILLIGRAM(S): 100 INJECTION SUBCUTANEOUS at 17:38

## 2018-04-19 RX ADMIN — Medication 1000 MILLIGRAM(S): at 17:29

## 2018-04-19 RX ADMIN — Medication 400 MILLIGRAM(S): at 09:24

## 2018-04-19 RX ADMIN — Medication 50 MILLIGRAM(S): at 17:06

## 2018-04-19 RX ADMIN — Medication 1000 MILLIGRAM(S): at 09:54

## 2018-04-19 RX ADMIN — Medication 400 MILLIGRAM(S): at 16:59

## 2018-04-19 RX ADMIN — Medication 50 MILLIGRAM(S): at 05:40

## 2018-04-19 RX ADMIN — Medication 15 MILLIGRAM(S): at 18:42

## 2018-04-19 RX ADMIN — Medication 1000 MILLIGRAM(S): at 03:35

## 2018-04-19 RX ADMIN — Medication 15 MILLIGRAM(S): at 19:30

## 2018-04-19 NOTE — PROGRESS NOTE ADULT - ASSESSMENT
Pt is a 38 yo F with a PMH of Migraine and C-Spine Fusion (s/p fall) who is now presenting as a transfer from [facility] after developing right sided weakness 2/2 fall down stairs. On exam pt has significant right sided weakness which is possibly effort dependent. MRI brain and MRA C spine are negative for any pathology. rEEG negative for seizure events.     Right sided hemiparesis s/p fall  Left hip pain, xray negative    Plan:  Pain control, toradol for breakthrough  Pt/OT - acute rehab  SW/CM follow up regarding placement Pt is a 38 yo F with a PMH of Migraine and C-Spine Fusion (s/p fall) who is now presenting as a transfer from [facility] after developing right sided weakness 2/2 fall down stairs. On exam pt has significant right sided weakness which is possibly effort dependent. MRI brain and MRA C spine are negative for any pathology. rEEG negative for seizure events.     Right sided hemiparesis s/p fall  Left hip pain, xray negative    Plan:  Pain control, toradol for breakthrough, responding to IV tylenol.  PM&R recommending home with outpatient PT  Will consult psych for possible conversion disorder Pt is a 36 yo F with a PMH of Migraine and C-Spine Fusion (s/p fall) who is now presenting as a transfer from [facility] after developing right sided weakness 2/2 fall down stairs. On exam pt has significant right sided weakness which is possibly effort dependent. MRI brain and MRA C spine are negative for any pathology. rEEG negative for seizure events. Normal CK.    Right sided hemiparesis s/p fall, unclear etiology  Left hip pain, xray negative    Plan:  Pain control, toradol for breakthrough, responding to IV tylenol.  PM&R recommending home with outpatient PT  Will consult psych for possible conversion disorder

## 2018-04-19 NOTE — BEHAVIORAL HEALTH ASSESSMENT NOTE - NSBHCHARTREVIEWIMAGING_PSY_A_CORE FT
< from: MR Cervical Spine No Cont (04.15.18 @ 20:51) >    There is evidence of postop changes compatible with anterior cervical   spine fusion. This extends from C3 to C5. The metallic hardware does   appear to be adequately placed.    C2-3: Disc bulge is identified. There is abnormal T2 prolongation   associated this disc bulge which could be compatible with underlying   annular tear. Clinical correlation continued close interval follow-up is   recommended.    C3-4: Normal.     C4-5: Hypertrophic change is seen involving the right uncovertebral   joint. Mild to moderate narrowing of the right neural foramen is seen.    C5-6: Disc bulge and bilateral hypertrophic facet joint changes seen. No   significant, minus of the spinal canal or either neural foramen.    C6-7: Normal    < end of copied text >

## 2018-04-19 NOTE — DISCHARGE NOTE ADULT - MEDICATION SUMMARY - MEDICATIONS TO STOP TAKING
I will STOP taking the medications listed below when I get home from the hospital:    SUMAtriptan nasal  -- 1 spray(s) into nose once a day, As Needed

## 2018-04-19 NOTE — DISCHARGE NOTE ADULT - PLAN OF CARE
Provide rehab Please continue with Physical therapy in the rehab center. You should follow up at the Neurology Clinic for continued management and monitoring. Follow up with your outpatient neurologist Please continue with Physical therapy in the rehab center.  Follow up with your outpatient neurologist Dr Santos Follow up with your outpatient neurologist Dr Santos Please continue with Physical therapy in the rehab center.  Follow up with your outpatient neurologist Dr Santos. Follow up with your outpatient neurologist Dr Santos.

## 2018-04-19 NOTE — DISCHARGE NOTE ADULT - MEDICATION SUMMARY - MEDICATIONS TO TAKE
I will START or STAY ON the medications listed below when I get home from the hospital:    traMADol 50 mg oral tablet  -- 0.5 tab(s) by mouth every 6 hours, As needed, Moderate Pain (4 - 6)  -- Indication: For Hip pain    celecoxib 200 mg oral capsule  -- 1 cap(s) by mouth once a day  -- Indication: For Hemiparesis of right dominant side, unspecified hemiparesis etiology    topiramate 50 mg oral tablet  -- 1 tab(s) by mouth 2 times a day  -- Indication: For Headache    benzocaine-menthol 15 mg-3.6 mg mucous membrane lozenge  -- mucous membrane 2 times a day  -- Indication: For Sore throat

## 2018-04-19 NOTE — BEHAVIORAL HEALTH ASSESSMENT NOTE - NSBHCHARTREVIEWINVESTIGATE_PSY_A_CORE FT
< from: 12 Lead ECG (04.15.18 @ 22:54) >    Ventricular Rate 71 BPM    Atrial Rate 71 BPM    P-R Interval 148 ms    QRS Duration 88 ms     ms    QTc 421 ms    < end of copied text >

## 2018-04-19 NOTE — BEHAVIORAL HEALTH ASSESSMENT NOTE - HPI (INCLUDE ILLNESS QUALITY, SEVERITY, DURATION, TIMING, CONTEXT, MODIFYING FACTORS, ASSOCIATED SIGNS AND SYMPTOMS)
This is a 37-year-old AAF pt. with no appreciable PPH and a PMH of migraine and C-Spine Fusion (s/p fall) who is now presenting as a transfer from [facility] after developing right sided weakness due to fall down the stairs. As significant findings have not been identified, an evaluation for conversion disorder has been requested.    On evaluation, patient is calm, pleasant, and cooperative. She notes that she was walking down the stairs and slipped, fell forward on her knees and landed with the front of her head into the door at the bottom of the stairs. When she tried to get up she could not due to the right sided weakness and the left hip pain, associated is a right sided numbness. Pt denies any LOC from, shaking, urinary/bowel loss or tongue biting. Pt notes similar prior episode when she fell last time, only at that point her left side was weak, and only improved after spinal surgery. Pt denies any recent fevers, chills, HA, dizziness, SOB, CP, cough, nausea, vomiting, abdominal pain, changes in BMs/ urination. After admission, she experienced mild improvement, to the point of regaining some of the sensation in her R-side and being able to walk six steps today. She is satisfied with her improvement and remains optimistic about her recovery. Denies SI/HI, depressed mood, hopelessness, helplessness, sleep or other vegetative problems.    She confirms a history of possible PTSD or phobic reactions after her first fall associated with water. She underwent treatment and has not been in need of any psychiatric care in recent years.

## 2018-04-19 NOTE — DISCHARGE NOTE ADULT - CARE PROVIDER_API CALL
Vishal Krishna (MD), Clinical Neurophysiology; EEGEpilepsy; Neurology  [address] [address]  Phone: [phone]  Fax: [phone]

## 2018-04-19 NOTE — DISCHARGE NOTE ADULT - CARE PLAN
Principal Discharge DX:	Hemiparesis of right dominant side, unspecified hemiparesis etiology  Goal:	Provide rehab  Assessment and plan of treatment:	Please continue with Physical therapy in the rehab center. You should follow up at the Neurology Clinic for continued management and monitoring.  Secondary Diagnosis:	S/P cervical spinal fusion Principal Discharge DX:	Hemiparesis of right dominant side, unspecified hemiparesis etiology  Goal:	Provide rehab  Assessment and plan of treatment:	Please continue with Physical therapy in the rehab center. You should follow up at the Neurology Clinic for continued management and monitoring.  Secondary Diagnosis:	S/P cervical spinal fusion  Assessment and plan of treatment:	Follow up with your outpatient neurologist Principal Discharge DX:	Hemiparesis of right dominant side, unspecified hemiparesis etiology  Goal:	Provide rehab  Assessment and plan of treatment:	Please continue with Physical therapy in the rehab center.  Follow up with your outpatient neurologist Dr Santos  Secondary Diagnosis:	S/P cervical spinal fusion  Assessment and plan of treatment:	Follow up with your outpatient neurologist Dr Santos Principal Discharge DX:	Hemiparesis of right dominant side, unspecified hemiparesis etiology  Goal:	Provide rehab  Assessment and plan of treatment:	Please continue with Physical therapy in the rehab center.  Follow up with your outpatient neurologist Dr Santos.  Secondary Diagnosis:	S/P cervical spinal fusion  Assessment and plan of treatment:	Follow up with your outpatient neurologist Dr Santos.

## 2018-04-19 NOTE — BEHAVIORAL HEALTH ASSESSMENT NOTE - AXIS III
This is a 37-year-old AAF pt. with no appreciable PPH and a PMH of migraine and C-Spine Fusion (s/p fall) who is now presenting as a transfer from [facility] after developing right sided weakness due to fall down the stairs. As significant findings have not been identified, an evaluation for conversion disorder has been requested. Patient does not meet the criteria for conversion disorder.

## 2018-04-19 NOTE — PROGRESS NOTE ADULT - SUBJECTIVE AND OBJECTIVE BOX
Neurology Consult Note    Interval history  - Still complaining of left hip pain.        Vital Signs Last 24 Hrs      	Neurological Exam:  	Mental Status: Orientated to self, date and place.  Attention intact.  No dysarthria. Speech fluent.  	Cranial Nerves: PERRL, EOMI, VFF, no nystagmus.    CN V1-3 intact to light touch b/l.  No facial asymmetry.  Hearing intact to finger rub bilaterally.  Tongue, uvula and palate midline.  Sternocleidomastoid and Trapezius intact bilaterally.    	Motor:   	Tone: normal.  	Strength:  0/5 in the RUE, 0/5 in the RLE, 5/5 in the LUE and LLE  	Drift: RUE slow drift  	Dysmetria: None to finger-nose-finger on the left  	No truncal ataxia.    	Tremor: No resting, postural or action tremor.  No myoclonus.    	Sensation: intact to temp throughout, inconsistent to LT and pinprick throughout    	Deep Tendon Reflexes:               Biceps        BR        Patellar        Ankle       Babinski  	                                       R        2+             1+        1+               1+           mute                                         L         2+             1+        1+               1+           down    Labs      Imaging    < from: Xray Hip 2-3 Views, Left (04.18.18 @ 11:15) >  FINDINGS/  IMPRESSION:    There is no acute fracture or dislocation of the left hip. Normal bone   mineralization and osseous trabecular pattern. The joint spaces are   maintained.     < end of copied text >      Unremarkable CTA of the head and neck. No evidence for carotid or   vertebral dissection.    MRI of the brain was performed using sagittal T1, axial SPGR, T2, FLAIR   diffusion and susceptibility weighted sequence.The lateral ventricles have a normal configuration and There is no evidence of acute hemorrhage, mass mass effect or abnormal signal in posterior fossa supratentorial regioned.  Impression: No evidence of acute hemorrhage, mass or mass effect.    MRI cervical spine There is evidence of postop changes compatible with anterior cervical   spine fusion. This extends from C3 to C5. The metallic hardware does   appear to be adequately placed.    C2-3: Disc bulge is identified. There is abnormal T2 prolongation   associated this disc bulge which could be compatible with underlying   annular tear. Clinical correlation continued close interval follow-up is   recommended.    C3-4: Normal.     C4-5: Hypertrophic change is seen involving the right uncovertebral   joint. Mild to moderate narrowing of the right neural foramen is seen.    C5-6: Disc bulge and bilateral hypertrophic facet joint changes seen. No   significant, minus of the spinal canal or either neural foramen.    C6-7: Normal    C7-T1: Normal    Spinal cord demonstrates normal signal and caliber.    Postop changes are identified.    Degenerative changes as described.    Disc bulge is seen at the C2-3 level with associated T2 prolongation as   described above. Neurology Consult Note    Interval history  - Still complaining of left hip pain such that she has to avoid weight-bearing. States that she is starting to be able to move her fingers on her right hand.    MEDICATIONS  (STANDING):  enoxaparin Injectable 40 milliGRAM(s) SubCutaneous every 24 hours  influenza   Vaccine 0.5 milliLiter(s) IntraMuscular once  topiramate 50 milliGRAM(s) Oral two times a day    MEDICATIONS  (PRN):  acetaminophen   Tablet 1000 milliGRAM(s) Oral every 8 hours PRN severe pain  ibuprofen  Tablet 600 milliGRAM(s) Oral every 8 hours PRN moderate pain      Vital Signs Last 24 Hrs  T(C): 36.8 (19 Apr 2018 12:05), Max: 36.9 (18 Apr 2018 20:53)  T(F): 98.2 (19 Apr 2018 12:05), Max: 98.5 (18 Apr 2018 20:53)  HR: 69 (19 Apr 2018 12:05) (69 - 88)  BP: 104/74 (19 Apr 2018 12:05) (93/60 - 104/74)  BP(mean): --  RR: 18 (19 Apr 2018 12:05) (18 - 18)  SpO2: 98% (19 Apr 2018 12:05) (98% - 99%)    	Neurological Exam:  	Mental Status: Orientated to self, date and place.  Attention intact.  No dysarthria. Speech fluent.  	Cranial Nerves: PERRL, EOMI, VFF, no nystagmus.    CN V1-3 intact to light touch b/l.  No facial asymmetry.  Hearing intact to finger rub bilaterally.  Tongue, uvula and palate midline.  Sternocleidomastoid and Trapezius intact bilaterally.    	Motor:   	Tone: normal.  	Strength:  0/5 in the RUE proximally, has trace finger movements in right hand, 0/5 in the RLE, 5/5 in the LUE and LLE  	Drift: RUE slow drift  	Dysmetria: None to finger-nose-finger on the left  	No truncal ataxia.    	Tremor: No resting, postural or action tremor.  No myoclonus.    	Sensation: intact to temp throughout, inconsistent to LT and pinprick throughout    	Deep Tendon Reflexes:               Biceps        BR        Patellar        Ankle       Babinski  	                                       R        2+             1+        1+               1+           mute                                         L         2+             1+        1+               1+           down    Labs      Imaging    < from: Xray Hip 2-3 Views, Left (04.18.18 @ 11:15) >  FINDINGS/  IMPRESSION:    There is no acute fracture or dislocation of the left hip. Normal bone   mineralization and osseous trabecular pattern. The joint spaces are   maintained.     < end of copied text >      Unremarkable CTA of the head and neck. No evidence for carotid or   vertebral dissection.    MRI of the brain was performed using sagittal T1, axial SPGR, T2, FLAIR   diffusion and susceptibility weighted sequence.The lateral ventricles have a normal configuration and There is no evidence of acute hemorrhage, mass mass effect or abnormal signal in posterior fossa supratentorial regioned.  Impression: No evidence of acute hemorrhage, mass or mass effect.    MRI cervical spine There is evidence of postop changes compatible with anterior cervical   spine fusion. This extends from C3 to C5. The metallic hardware does   appear to be adequately placed.    C2-3: Disc bulge is identified. There is abnormal T2 prolongation   associated this disc bulge which could be compatible with underlying   annular tear. Clinical correlation continued close interval follow-up is   recommended.    C3-4: Normal.     C4-5: Hypertrophic change is seen involving the right uncovertebral   joint. Mild to moderate narrowing of the right neural foramen is seen.    C5-6: Disc bulge and bilateral hypertrophic facet joint changes seen. No   significant, minus of the spinal canal or either neural foramen.    C6-7: Normal    C7-T1: Normal    Spinal cord demonstrates normal signal and caliber.    Postop changes are identified.    Degenerative changes as described.    Disc bulge is seen at the C2-3 level with associated T2 prolongation as   described above. Neurology Consult Note    Interval history  - Still complaining of left hip pain such that she has to avoid weight-bearing. States that she is starting to be able to move her fingers on her right hand.    MEDICATIONS  (STANDING):  enoxaparin Injectable 40 milliGRAM(s) SubCutaneous every 24 hours  influenza   Vaccine 0.5 milliLiter(s) IntraMuscular once  topiramate 50 milliGRAM(s) Oral two times a day    MEDICATIONS  (PRN):  acetaminophen   Tablet 1000 milliGRAM(s) Oral every 8 hours PRN severe pain  ibuprofen  Tablet 600 milliGRAM(s) Oral every 8 hours PRN moderate pain      Vital Signs Last 24 Hrs  T(C): 36.8 (19 Apr 2018 12:05), Max: 36.9 (18 Apr 2018 20:53)  T(F): 98.2 (19 Apr 2018 12:05), Max: 98.5 (18 Apr 2018 20:53)  HR: 69 (19 Apr 2018 12:05) (69 - 88)  BP: 104/74 (19 Apr 2018 12:05) (93/60 - 104/74)  BP(mean): --  RR: 18 (19 Apr 2018 12:05) (18 - 18)  SpO2: 98% (19 Apr 2018 12:05) (98% - 99%)    	Neurological Exam:  	Mental Status: Orientated to self, date and place.  Attention intact.  No dysarthria. Speech fluent.  	Cranial Nerves: PERRL, EOMI, VFF, no nystagmus.    CN V1-3 intact to light touch b/l.  No facial asymmetry.  Hearing intact to finger rub bilaterally.  Tongue, uvula and palate midline.  Sternocleidomastoid and Trapezius intact bilaterally.    	Motor:   	Tone: normal.  	Strength:  0/5 in the RUE proximally, has trace finger movements in right hand, 0/5 in the RLE, 5/5 in the LUE and LLE  	Drift: RUE slow drift  	Dysmetria: None to finger-nose-finger on the left  	No truncal ataxia.    	Tremor: No resting, postural or action tremor.  No myoclonus.    	Sensation: intact to temp throughout, inconsistent to LT and pinprick throughout    	Deep Tendon Reflexes:               Biceps        BR        Patellar        Ankle       Babinski  	                                       R        2+             1+        1+               1+           mute                                         L         2+             1+        1+               1+           down    Labs    Creatine Kinase, Serum (04.19.18 @ 13:11)    Creatine Kinase, Serum: 60 U/L        Imaging    < from: Xray Hip 2-3 Views, Left (04.18.18 @ 11:15) >  FINDINGS/  IMPRESSION:    There is no acute fracture or dislocation of the left hip. Normal bone   mineralization and osseous trabecular pattern. The joint spaces are   maintained.     < end of copied text >      Unremarkable CTA of the head and neck. No evidence for carotid or   vertebral dissection.    MRI of the brain was performed using sagittal T1, axial SPGR, T2, FLAIR   diffusion and susceptibility weighted sequence.The lateral ventricles have a normal configuration and There is no evidence of acute hemorrhage, mass mass effect or abnormal signal in posterior fossa supratentorial regioned.  Impression: No evidence of acute hemorrhage, mass or mass effect.    MRI cervical spine There is evidence of postop changes compatible with anterior cervical   spine fusion. This extends from C3 to C5. The metallic hardware does   appear to be adequately placed.    C2-3: Disc bulge is identified. There is abnormal T2 prolongation   associated this disc bulge which could be compatible with underlying   annular tear. Clinical correlation continued close interval follow-up is   recommended.    C3-4: Normal.     C4-5: Hypertrophic change is seen involving the right uncovertebral   joint. Mild to moderate narrowing of the right neural foramen is seen.    C5-6: Disc bulge and bilateral hypertrophic facet joint changes seen. No   significant, minus of the spinal canal or either neural foramen.    C6-7: Normal    C7-T1: Normal    Spinal cord demonstrates normal signal and caliber.    Postop changes are identified.    Degenerative changes as described.    Disc bulge is seen at the C2-3 level with associated T2 prolongation as   described above.

## 2018-04-19 NOTE — DISCHARGE NOTE ADULT - PATIENT PORTAL LINK FT
You can access the [email or web address], offered by [facility], by registering with the following website: [email or web address]

## 2018-04-19 NOTE — CONSULT NOTE ADULT - SUBJECTIVE AND OBJECTIVE BOX
Patient is a 37y old  Female who presents with a chief complaint of weakness (19 Apr 2018 07:37)      HPI:  38 yo RHD woman with PMHx of Migraine Headaches and C3-C5 ACDF in 2009 s/p fall    Pt is a 38 yo F with a PMH of Migraine and C-Spine Fusion (s/p fall) who is now presenting as a transfer from [facility] after developing right sided weakness 2/2 fall down stairs. Pt notes that she was walking down the stairs and slipped, fell forward on her knees and landed with the front of her head into the door at the bottom of the stairs. When she tried to get up she could not due to the right sided weakness and the left hip pain, associated is a right sided numbness. Pt denies any LOC from, shaking, urinary/bowel loss or tongue biting. Pt notes similar prior episode when she fell last time, only at that point her left side was weak, and only improved after spinal surgery. Pt denies any recent fevers, chills, HA, dizziness, SOB, CP, cough, nausea, vomiting, abdominal pain, changes in BMs/urination. (16 Apr 2018 01:41)          PT/OT EVALUATION:  BED MOBILITY: mod assist  TRANSFERS: sit-to-stand mod assist  GAIT: not eval'd  ADLS: OT eval pending    PAST MEDICAL & SURGICAL HISTORY:  Migraine headaches  S/P cervical spinal fusion: C3-C5  No significant past surgical history      FAMILY HISTORY:  Non-contributory; no family history of CVA    SOCIAL HISTORY:  TOBACCO: denies history  ALCOHOL: denies abuse  IVDA: denies history    FUNCTIONAL, ENVIRONMENTAL HISTORY:  WORK HISTORY: on disability since ACDF in 2009, formerly full time   LIVES WITH: spouse  HOME LAYOUT: apartment  STAIRS TO ENTER: 4+16  STAIRS INSIDE: none  FUNCTIONAL HISTORY: independent without AD for ambulation, transfers, and ADLs; +    Allergies    aciditic foods (Rash)  chocolate (Urticaria; Rash)  No Known Drug Allergies    Intolerances        MEDICATIONS  (STANDING):  enoxaparin Injectable 40 milliGRAM(s) SubCutaneous every 24 hours  influenza   Vaccine 0.5 milliLiter(s) IntraMuscular once  topiramate 50 milliGRAM(s) Oral two times a day    MEDICATIONS  (PRN):  acetaminophen   Tablet 1000 milliGRAM(s) Oral every 8 hours PRN severe pain  ibuprofen  Tablet 600 milliGRAM(s) Oral every 8 hours PRN moderate pain      REVIEW OF SYSTEMS:    CONSTITUTIONAL: No fever  EYES: No visual disturbances  ENMT:  No difficulty hearing, No throat pain  RESPIRATORY: No cough, No shortness of breath  CARDIOVASCULAR: No chest pain, No palpitations  GASTROINTESTINAL: No abdominal pain, No nausea, No vomiting, No diarrhea, No constipation  GENITOURINARY: No dysuria, No frequency, No incontinence  NEUROLOGICAL: +Loss of strength on RUE and RLE with some numbness/tingling, No headaches, No dizziness  SKIN: No itching, No rashes  MUSCULOSKELETAL: No joint/muscle pain; No back pain  PSYCHIATRIC: No depression, No anxiety    Vital Signs Last 24 Hrs  T(C): 36.8 (19 Apr 2018 08:19), Max: 36.9 (18 Apr 2018 20:53)  T(F): 98.2 (19 Apr 2018 08:19), Max: 98.5 (18 Apr 2018 20:53)  HR: 88 (19 Apr 2018 08:19) (73 - 88)  BP: 95/63 (19 Apr 2018 08:19) (93/60 - 97/60)  BP(mean): --  RR: 18 (19 Apr 2018 08:19) (18 - 18)  SpO2: 99% (19 Apr 2018 08:19) (98% - 99%)    PHYSICAL EXAM:    GENERAL: NAD, well-groomed, well-developed  HEENT:  NCAT, EOMI  HEART: S1/S2, warm & well perfused  CHEST/LUNG: CTA b/l, normal respiratory effort  ABDOMEN: Soft, Nontender  EXTREMITIES:  2+ Peripheral Pulses, No edema  SKIN: No rashes or lesions  NERVOUS SYSTEM:  Alert & Oriented X3, speech intact  CNs II-XII grossly intact  Left Motor Strength: 5/5 upper and lower extremities  Right Motor Strength: 0/5 upper and lower extremities when formally tested except 1/5 finger flexors; however demonstrates ability to hold arm against gravity to prevent arm from falling onto face when arm is let go overhead  Sensation intact to light touch symmetrically  DTRs 2+ intact and symmetric    LABS:    No recent labs    RADIOLOGY & ADDITIONAL STUDIES:    4/18/18 Left Hip XR IMPRESSION:   There is no acute fracture or dislocation of the left hip. Normal bone mineralization and osseous trabecular pattern. The joint spaces are maintained.    4/17/18 CXR IMPRESSION:   Clear lungs.    4/15/18 MRI Brain/C-Spine Impression:  No evidence of acute hemorrhage, mass or mass effect. Mild scoliosis is seen. Loss of the normal abnormal cervical lordosis is seen which could be due to positioning or muscle spasm.There is evidence of postop changes compatible with anterior cervical spine fusion. This extends from C3 to C5. The metallic hardware does appear to be adequately placed.   C2-3: Disc bulge is identified. There is abnormal T2 prolongation associated with this disc bulge which could be compatible with underlying annular tear.   C3-4: Normal.   C4-5: Hypertrophic change is seen involving the right uncovertebral joint. Mild to moderate narrowing of the right neural foramen is seen.   C5-6: Disc bulge and bilateral hypertrophic facet joint changes seen. No significant, minus of the spinal canal or either neural foramen.   C6-7: Normal   C7-T1: Normal   Spinal cord demonstrates normal signal and caliber. Postop changes are identified. Degenerative changes. Disc bulge is seen at the C2-3 level with associated T2 prolongation as described above.    4/15/18 CTA Head/Neck IMPRESSION:   Unremarkable CTA of the head and neck. No evidence for carotid or vertebral dissection.    4/15/18 CT Head/C-Spine IMPRESSION:   CT HEAD: No acute intracranial hemorrhage, mass effect, or evidence of acute displaced calvarial fracture.   CT CERVICAL SPINE: No acute displaced cervical spine fracture or evidence of traumatic malalignment.        ASSESSMENT:  37F with functional deficits and right-sided hemiparesis s/p fall without evidence of spinal cord/brain injury on MRI.  ~On fall precautions without WB restrictions  ~On Lovenox for DVT PPX  ~On Tylenol/Ibuprofen PRN for pain management  ~On Topamax for migraines    RECOMMENDATIONS:  ~Medical management/workup as per primary team  ~When medically stable, recommend Patient is a 37y old  Female who presents with a chief complaint of weakness (19 Apr 2018 07:37)      HPI:  36 yo RHD woman with PMHx of Migraine Headaches and C3-C5 ACDF in 2009 s/p fall who presented to [facility] on [facility] as a transfer from [facility] for right-sided weakness that developed after a mechanical fall down the stairs. She reports slipping down stairs and hitting her knees at the bottom as well as the front of her head on the door. She was unable to get up due to right sided weakness and left hip pain, with associated right sided numbness. Denies LOC, urinary/bowel incontinence, and tongue biting. Reports similar episode in 2009 that resulted in ACDF for left-sided weakness that resovled after spinal surgery. Workup was negative for any brain injury or spinal cord injury on MRI.      PT/OT EVALUATION:  BED MOBILITY: mod assist  TRANSFERS: sit-to-stand mod assist  GAIT: not eval'd  ADLS: OT eval pending    PAST MEDICAL & SURGICAL HISTORY:  Migraine headaches  S/P cervical spinal fusion: C3-C5  No significant past surgical history      FAMILY HISTORY:  Non-contributory; no family history of CVA    SOCIAL HISTORY:  TOBACCO: denies history  ALCOHOL: denies abuse  IVDA: denies history    FUNCTIONAL, ENVIRONMENTAL HISTORY:  WORK HISTORY: on disability since ACDF in 2009, formerly full time   LIVES WITH: spouse  HOME LAYOUT: apartment  STAIRS TO ENTER: 4+16  STAIRS INSIDE: none  FUNCTIONAL HISTORY: independent without AD for ambulation, transfers, and ADLs; +    Allergies    aciditic foods (Rash)  chocolate (Urticaria; Rash)  No Known Drug Allergies    Intolerances        MEDICATIONS  (STANDING):  enoxaparin Injectable 40 milliGRAM(s) SubCutaneous every 24 hours  influenza   Vaccine 0.5 milliLiter(s) IntraMuscular once  topiramate 50 milliGRAM(s) Oral two times a day    MEDICATIONS  (PRN):  acetaminophen   Tablet 1000 milliGRAM(s) Oral every 8 hours PRN severe pain  ibuprofen  Tablet 600 milliGRAM(s) Oral every 8 hours PRN moderate pain      REVIEW OF SYSTEMS:    CONSTITUTIONAL: No fever  EYES: No visual disturbances  ENMT:  No difficulty hearing, No throat pain  RESPIRATORY: No cough, No shortness of breath  CARDIOVASCULAR: No chest pain, No palpitations  GASTROINTESTINAL: No abdominal pain, No nausea, No vomiting, No diarrhea, No constipation  GENITOURINARY: No dysuria, No frequency, No incontinence  NEUROLOGICAL: +Loss of strength on RUE and RLE with some numbness/tingling, No headaches, No dizziness  SKIN: No itching, No rashes  MUSCULOSKELETAL: No joint/muscle pain; No back pain  PSYCHIATRIC: No depression, No anxiety    Vital Signs Last 24 Hrs  T(C): 36.8 (19 Apr 2018 08:19), Max: 36.9 (18 Apr 2018 20:53)  T(F): 98.2 (19 Apr 2018 08:19), Max: 98.5 (18 Apr 2018 20:53)  HR: 88 (19 Apr 2018 08:19) (73 - 88)  BP: 95/63 (19 Apr 2018 08:19) (93/60 - 97/60)  BP(mean): --  RR: 18 (19 Apr 2018 08:19) (18 - 18)  SpO2: 99% (19 Apr 2018 08:19) (98% - 99%)    PHYSICAL EXAM:    GENERAL: NAD, well-groomed, well-developed  HEENT:  NCAT, EOMI  HEART: S1/S2, warm & well perfused  CHEST/LUNG: CTA b/l, normal respiratory effort  ABDOMEN: Soft, Nontender  EXTREMITIES:  2+ Peripheral Pulses, No edema  SKIN: No rashes or lesions  NERVOUS SYSTEM:  Alert & Oriented X3, speech intact  CNs II-XII grossly intact  Left Motor Strength: 5/5 upper and lower extremities  Right Motor Strength: 0/5 upper and lower extremities when formally tested except 1/5 finger flexors; however demonstrates ability to hold arm against gravity to prevent arm from falling onto face when arm is let go overhead  Sensation intact to light touch symmetrically  DTRs 2+ intact and symmetric    LABS:    No recent labs    RADIOLOGY & ADDITIONAL STUDIES:    4/18/18 Left Hip XR IMPRESSION:   There is no acute fracture or dislocation of the left hip. Normal bone mineralization and osseous trabecular pattern. The joint spaces are maintained.    4/17/18 CXR IMPRESSION:   Clear lungs.    4/15/18 MRI Brain/C-Spine Impression:  No evidence of acute hemorrhage, mass or mass effect. Mild scoliosis is seen. Loss of the normal abnormal cervical lordosis is seen which could be due to positioning or muscle spasm.There is evidence of postop changes compatible with anterior cervical spine fusion. This extends from C3 to C5. The metallic hardware does appear to be adequately placed.   C2-3: Disc bulge is identified. There is abnormal T2 prolongation associated with this disc bulge which could be compatible with underlying annular tear.   C3-4: Normal.   C4-5: Hypertrophic change is seen involving the right uncovertebral joint. Mild to moderate narrowing of the right neural foramen is seen.   C5-6: Disc bulge and bilateral hypertrophic facet joint changes seen. No significant, minus of the spinal canal or either neural foramen.   C6-7: Normal   C7-T1: Normal   Spinal cord demonstrates normal signal and caliber. Postop changes are identified. Degenerative changes. Disc bulge is seen at the C2-3 level with associated T2 prolongation as described above.    4/15/18 CTA Head/Neck IMPRESSION:   Unremarkable CTA of the head and neck. No evidence for carotid or vertebral dissection.    4/15/18 CT Head/C-Spine IMPRESSION:   CT HEAD: No acute intracranial hemorrhage, mass effect, or evidence of acute displaced calvarial fracture.   CT CERVICAL SPINE: No acute displaced cervical spine fracture or evidence of traumatic malalignment.        ASSESSMENT:  37F with functional deficits and right-sided hemiparesis s/p fall without evidence of spinal cord/brain injury on MRI.  ~On fall precautions without WB restrictions  ~On Lovenox for DVT PPX  ~On Tylenol/Ibuprofen PRN for pain management  ~On Topamax for migraines    RECOMMENDATIONS:  ~Medical management/workup as per primary team  ~When medically stable, recommend Patient is a 37y old  Female who presents with a chief complaint of weakness (19 Apr 2018 07:37)      HPI:  36 yo RHD woman with PMHx of Migraine Headaches and C3-C5 ACDF in 2009 s/p fall who presented to [facility] on 4/15/18 as a transfer from [facility] for right-sided weakness that developed after a mechanical fall down the stairs in the hotel at which she was staying. She reports slipping down stairs and hitting her knees at the bottom as well as the front of her head on the door. She was unable to get up due to right sided weakness and left hip pain, with associated right sided numbness. Denies LOC, urinary/bowel incontinence, and tongue biting. Reports similar episode in 2009 that resulted in ACDF for left-sided weakness that resolved after spinal surgery. Workup was negative for any brain injury or spinal cord injury on MRI.      PT/OT EVALUATION:  BED MOBILITY: mod assist  TRANSFERS: sit-to-stand mod assist  GAIT: not eval'd  ADLS: OT eval pending    PAST MEDICAL & SURGICAL HISTORY:  Migraine headaches  S/P cervical spinal fusion: C3-C5  No significant past surgical history      FAMILY HISTORY:  Non-contributory; no family history of CVA    SOCIAL HISTORY:  TOBACCO: denies history  ALCOHOL: denies abuse  IVDA: denies history    FUNCTIONAL, ENVIRONMENTAL HISTORY:  WORK HISTORY: on disability since ACDF in 2009, formerly full time   LIVES WITH: spouse  HOME LAYOUT: apartment  STAIRS TO ENTER: 4+16  STAIRS INSIDE: none  FUNCTIONAL HISTORY: independent without AD for ambulation, transfers, and ADLs; +    Allergies    aciditic foods (Rash)  chocolate (Urticaria; Rash)  No Known Drug Allergies    Intolerances        MEDICATIONS  (STANDING):  enoxaparin Injectable 40 milliGRAM(s) SubCutaneous every 24 hours  influenza   Vaccine 0.5 milliLiter(s) IntraMuscular once  topiramate 50 milliGRAM(s) Oral two times a day    MEDICATIONS  (PRN):  acetaminophen   Tablet 1000 milliGRAM(s) Oral every 8 hours PRN severe pain  ibuprofen  Tablet 600 milliGRAM(s) Oral every 8 hours PRN moderate pain      REVIEW OF SYSTEMS:    CONSTITUTIONAL: No fever  EYES: No visual disturbances  ENMT:  No difficulty hearing, No throat pain  RESPIRATORY: No cough, No shortness of breath  CARDIOVASCULAR: No chest pain, No palpitations  GASTROINTESTINAL: No abdominal pain, No nausea, No vomiting, No diarrhea, No constipation  GENITOURINARY: No dysuria, No frequency, No incontinence  NEUROLOGICAL: +Loss of strength on RUE and RLE with some numbness/tingling, No headaches, No dizziness  SKIN: No itching, No rashes  MUSCULOSKELETAL: No joint/muscle pain; No back pain  PSYCHIATRIC: No depression, No anxiety    Vital Signs Last 24 Hrs  T(C): 36.8 (19 Apr 2018 08:19), Max: 36.9 (18 Apr 2018 20:53)  T(F): 98.2 (19 Apr 2018 08:19), Max: 98.5 (18 Apr 2018 20:53)  HR: 88 (19 Apr 2018 08:19) (73 - 88)  BP: 95/63 (19 Apr 2018 08:19) (93/60 - 97/60)  BP(mean): --  RR: 18 (19 Apr 2018 08:19) (18 - 18)  SpO2: 99% (19 Apr 2018 08:19) (98% - 99%)    PHYSICAL EXAM:    GENERAL: NAD, well-groomed, well-developed  HEENT:  NCAT, EOMI  HEART: S1/S2, warm & well perfused  CHEST/LUNG: CTA b/l, normal respiratory effort  ABDOMEN: Soft, Nontender  EXTREMITIES:  2+ Peripheral Pulses, No edema  SKIN: No rashes or lesions  NERVOUS SYSTEM:  Alert & Oriented X3, speech intact  CNs II-XII grossly intact  Left Motor Strength: 5/5 upper and lower extremities  Right Motor Strength: 0/5 upper and lower extremities when formally tested except 1/5 finger flexors; however demonstrates ability to hold arm against gravity to prevent arm from falling onto face when arm is let go overhead  Sensation intact to light touch symmetrically  DTRs 2+ intact and symmetric    LABS:    No recent labs    RADIOLOGY & ADDITIONAL STUDIES:    4/18/18 Left Hip XR IMPRESSION:   There is no acute fracture or dislocation of the left hip. Normal bone mineralization and osseous trabecular pattern. The joint spaces are maintained.    4/17/18 CXR IMPRESSION:   Clear lungs.    4/15/18 MRI Brain/C-Spine Impression:  No evidence of acute hemorrhage, mass or mass effect. Mild scoliosis is seen. Loss of the normal abnormal cervical lordosis is seen which could be due to positioning or muscle spasm.There is evidence of postop changes compatible with anterior cervical spine fusion. This extends from C3 to C5. The metallic hardware does appear to be adequately placed.   C2-3: Disc bulge is identified. There is abnormal T2 prolongation associated with this disc bulge which could be compatible with underlying annular tear.   C3-4: Normal.   C4-5: Hypertrophic change is seen involving the right uncovertebral joint. Mild to moderate narrowing of the right neural foramen is seen.   C5-6: Disc bulge and bilateral hypertrophic facet joint changes seen. No significant, minus of the spinal canal or either neural foramen.   C6-7: Normal   C7-T1: Normal   Spinal cord demonstrates normal signal and caliber. Postop changes are identified. Degenerative changes. Disc bulge is seen at the C2-3 level with associated T2 prolongation as described above.    4/15/18 CTA Head/Neck IMPRESSION:   Unremarkable CTA of the head and neck. No evidence for carotid or vertebral dissection.    4/15/18 CT Head/C-Spine IMPRESSION:   CT HEAD: No acute intracranial hemorrhage, mass effect, or evidence of acute displaced calvarial fracture.   CT CERVICAL SPINE: No acute displaced cervical spine fracture or evidence of traumatic malalignment.        ASSESSMENT:  37F with functional deficits and right-sided hemiparesis s/p fall without evidence of spinal cord/brain injury on MRI. Suspect non-organic etiology.  ~On fall precautions without WB restrictions  ~On Lovenox for DVT PPX  ~On Tylenol/Ibuprofen PRN for pain management  ~On Topamax for migraines    RECOMMENDATIONS:  ~Medical management/workup as per primary team  ~Suspect non-organic cause of right-sided hemiparesis; cannot recommend inpatient rehabilitation without an organic diagnosis  ~When medically stable, recommend discharge to home with outpatient physical therapy. Patient and  are agreeable. They were reassured that she is likely to improve soon. Patient is a 37y old  Female who presents with a chief complaint of weakness (19 Apr 2018 07:37)    HPI:  38 yo RHD woman with PMHx of Migraine Headaches and C3-C5 ACDF in 2009 s/p fall who presented to [facility] on 4/15/18 as a transfer from [facility] for right-sided weakness that developed after a mechanical fall down the stairs in the hotel at which she was staying. She reports slipping down stairs and hitting her knees at the bottom as well as the front of her head on the door. She was unable to get up due to right sided weakness and left hip pain, with associated right sided numbness. Denies LOC, urinary/bowel incontinence, and tongue biting. Reports similar episode in 2009 that resulted in ACDF for left-sided weakness that resolved after spinal surgery. Workup was negative for any brain injury or spinal cord injury on MRI.    PT/OT EVALUATION:  BED MOBILITY: mod assist  TRANSFERS: sit-to-stand mod assist  GAIT: not eval'd  ADLS: OT eval pending    PAST MEDICAL & SURGICAL HISTORY:  Migraine headaches  S/P cervical spinal fusion: C3-C5  No significant past surgical history    FAMILY HISTORY:  Non-contributory; no family history of CVA    SOCIAL HISTORY:  TOBACCO: denies history  ALCOHOL: denies abuse  IVDA: denies history    FUNCTIONAL, ENVIRONMENTAL HISTORY:  WORK HISTORY: on disability since ACDF in 2009, formerly full time   LIVES WITH: spouse  HOME LAYOUT: apartment  STAIRS TO ENTER: 4+16  STAIRS INSIDE: none  FUNCTIONAL HISTORY: independent without AD for ambulation, transfers, and ADLs; +    Allergies    aciditic foods (Rash)  chocolate (Urticaria; Rash)  No Known Drug Allergies    MEDICATIONS  (STANDING):  enoxaparin Injectable 40 milliGRAM(s) SubCutaneous every 24 hours  influenza   Vaccine 0.5 milliLiter(s) IntraMuscular once  topiramate 50 milliGRAM(s) Oral two times a day    MEDICATIONS  (PRN):  acetaminophen   Tablet 1000 milliGRAM(s) Oral every 8 hours PRN severe pain  ibuprofen  Tablet 600 milliGRAM(s) Oral every 8 hours PRN moderate pain    REVIEW OF SYSTEMS:    CONSTITUTIONAL: No fever  EYES: No visual disturbances  ENMT:  No difficulty hearing, No throat pain  RESPIRATORY: No cough, No shortness of breath  CARDIOVASCULAR: No chest pain, No palpitations  GASTROINTESTINAL: No abdominal pain, No nausea, No vomiting, No diarrhea, No constipation  GENITOURINARY: No dysuria, No frequency, No incontinence  NEUROLOGICAL: +Loss of strength on RUE and RLE with some numbness/tingling, No headaches, No dizziness  SKIN: No itching, No rashes  MUSCULOSKELETAL: No joint/muscle pain; No back pain  PSYCHIATRIC: No depression, No anxiety    Vital Signs Last 24 Hrs  T(C): 36.8 (19 Apr 2018 08:19), Max: 36.9 (18 Apr 2018 20:53)  T(F): 98.2 (19 Apr 2018 08:19), Max: 98.5 (18 Apr 2018 20:53)  HR: 88 (19 Apr 2018 08:19) (73 - 88)  BP: 95/63 (19 Apr 2018 08:19) (93/60 - 97/60)  BP(mean): --  RR: 18 (19 Apr 2018 08:19) (18 - 18)  SpO2: 99% (19 Apr 2018 08:19) (98% - 99%)    PHYSICAL EXAM:    GENERAL: NAD, well-groomed, well-developed  HEENT:  NCAT, EOMI  HEART: S1/S2, warm & well perfused  CHEST/LUNG: CTA b/l, normal respiratory effort  ABDOMEN: Soft, Nontender  EXTREMITIES:  2+ Peripheral Pulses, No edema  SKIN: No rashes or lesions  NERVOUS SYSTEM:  Alert & Oriented X3, speech intact  CNs II-XII grossly intact  Left Motor Strength: 5/5 upper and lower extremities  Right Motor Strength: 0/5 upper and lower extremities when formally tested except 1/5 finger flexors; however demonstrates ability to hold arm against gravity to prevent arm from falling onto face when arm is let go overhead  Sensation intact to light touch symmetrically  DTRs 2+ intact and symmetric    LABS:    No recent labs    RADIOLOGY & ADDITIONAL STUDIES:    4/18/18 Left Hip XR IMPRESSION:   There is no acute fracture or dislocation of the left hip. Normal bone mineralization and osseous trabecular pattern. The joint spaces are maintained.    4/17/18 CXR IMPRESSION:   Clear lungs.    4/15/18 MRI Brain/C-Spine Impression:  No evidence of acute hemorrhage, mass or mass effect. Mild scoliosis is seen. Loss of the normal abnormal cervical lordosis is seen which could be due to positioning or muscle spasm.There is evidence of postop changes compatible with anterior cervical spine fusion. This extends from C3 to C5. The metallic hardware does appear to be adequately placed.   C2-3: Disc bulge is identified. There is abnormal T2 prolongation associated with this disc bulge which could be compatible with underlying annular tear.   C3-4: Normal.   C4-5: Hypertrophic change is seen involving the right uncovertebral joint. Mild to moderate narrowing of the right neural foramen is seen.   C5-6: Disc bulge and bilateral hypertrophic facet joint changes seen. No significant, minus of the spinal canal or either neural foramen.   C6-7: Normal   C7-T1: Normal   Spinal cord demonstrates normal signal and caliber. Postop changes are identified. Degenerative changes. Disc bulge is seen at the C2-3 level with associated T2 prolongation as described above.    4/15/18 CTA Head/Neck IMPRESSION:   Unremarkable CTA of the head and neck. No evidence for carotid or vertebral dissection.    4/15/18 CT Head/C-Spine IMPRESSION:   CT HEAD: No acute intracranial hemorrhage, mass effect, or evidence of acute displaced calvarial fracture.   CT CERVICAL SPINE: No acute displaced cervical spine fracture or evidence of traumatic malalignment.        ASSESSMENT:  37F with functional deficits and right-sided hemiparesis s/p fall without evidence of spinal cord/brain injury on MRI. Suspect non-organic etiology.  ~On fall precautions without WB restrictions  ~On Lovenox for DVT PPX  ~On Tylenol/Ibuprofen PRN for pain management  ~On Topamax for migraines    RECOMMENDATIONS:  ~Medical management/workup as per primary team  ~Suspect non-organic cause of right-sided hemiparesis; cannot recommend inpatient rehabilitation without an organic diagnosis as to cause of weakness.  ~When medically stable, recommend discharge to home with outpatient physical therapy. Patient and  are agreeable and prefer outpatient therapy. They were reassured that she is likely to improve soon.

## 2018-04-19 NOTE — BEHAVIORAL HEALTH ASSESSMENT NOTE - NSBHCHARTREVIEWVS_PSY_A_CORE FT
Vital Signs Last 24 Hrs  T(C): 36.8 (19 Apr 2018 12:05), Max: 36.9 (18 Apr 2018 20:53)  T(F): 98.2 (19 Apr 2018 12:05), Max: 98.5 (18 Apr 2018 20:53)  HR: 69 (19 Apr 2018 12:05) (69 - 88)  BP: 104/74 (19 Apr 2018 12:05) (93/60 - 104/74)  BP(mean): --  RR: 18 (19 Apr 2018 12:05) (18 - 18)  SpO2: 98% (19 Apr 2018 12:05) (98% - 99%)
09-Mar-2017

## 2018-04-19 NOTE — DISCHARGE NOTE ADULT - HOSPITAL COURSE
Pt is a 36 yo F with a PMH of Migraine and C-Spine Fusion (s/p fall) who is now presenting as a transfer from [facility] after developing right sided weakness 2/2 fall down stairs. Pt notes that she was walking down the stairs and slipped, fell forward on her knees and landed with the front of her head into the door at the bottom of the stairs. When she tried to get up she could not due to the right sided weakness and the left hip pain, associated is a right sided numbness. Pt denied any LOC from, shaking, urinary/bowel loss or tongue biting. Pt notes similar prior episode when she fell last time, only at that point her left side was weak, and only improved after spinal surgery.     After admission, MRI brain, MRI C spine, rEEG and Hip Xr were all negative for any findings. PT recommends LENNIE placement. Pt is a 38 yo F with a PMH of Migraine and C-Spine Fusion (s/p fall) who is now presenting as a transfer from [facility] after developing right sided weakness 2/2 fall down stairs. Pt notes that she was walking down the stairs and slipped, fell forward on her knees and landed with the front of her head into the door at the bottom of the stairs. When she tried to get up she could not due to the right sided weakness and the left hip pain, associated is a right sided numbness. Pt denied any LOC from, shaking, urinary/bowel loss or tongue biting. Pt notes similar prior episode when she fell last time, only at that point her left side was weak, and only improved after spinal surgery.     After admission, MRI brain, MRI C spine, rEEG and Hip Xr were all negative for any findings.   Patient was evaluated by orthopedic surgery who recommended a MRI of her left hip which was negative.     Weakness on the right side continued to improve slightly. She was evaluated by physical therapy who recommended acute rehab.   Her pain regimen was improved so that she would no longer require IV pain medications. Pt is a 36 yo F with a PMH of Migraine and C-Spine Fusion (s/p fall) who is now presenting as a transfer from [facility] after developing right sided weakness 2/2 fall down stairs. Pt notes that she was walking down the stairs and slipped, fell forward on her knees and landed with the front of her head into the door at the bottom of the stairs. When she tried to get up she could not due to the right sided weakness and the left hip pain, associated is a right sided numbness. Pt denied any LOC from, shaking, urinary/bowel loss or tongue biting. Pt notes similar prior episode when she fell last time, only at that point her left side was weak, and only improved after spinal surgery.     After admission, MRI brain, MRI C spine, rEEG and Hip Xr were all negative for any new objective findings.  Prior surgery in c spine and cord signal change noted (chronic)   Patient was evaluated by orthopedic surgery who recommended a MRI of her left hip which was negative.     Weakness on the right side continued to improve slightly. She was evaluated by physical therapy who recommended acute rehab.   Her pain regimen was improved so that she  no longer requires IV pain medications. Pt is a 36 yo F with a PMH of Migraine and C-Spine Fusion (s/p fall) who is now presenting as a transfer from [facility] after developing right sided weakness 2/2 fall down stairs. Pt notes that she was walking down the stairs and slipped, fell forward on her knees and landed with the front of her head into the door at the bottom of the stairs. When she tried to get up she could not due to the right sided weakness and the left hip pain, associated is a right sided numbness. Pt denied any LOC from, shaking, urinary/bowel loss or tongue biting. Pt notes similar prior episode when she fell last time, only at that point her left side was weak, and only improved after spinal surgery.     After admission, MRI brain, MRI C spine, rEEG and Hip Xr were all negative for any new objective findings.  Prior surgery in c spine and cord signal change noted (chronic)   Patient was evaluated by orthopedic surgery who recommended a MRI of her left hip which was negative.     Weakness on the right side continued to improve slightly. She was evaluated by physical therapy who recommended acute rehab.   Her pain regimen was improved so that she  no longer requires IV pain medications. She was able to walk with a walker on the day of discharge. Pt is a 36 yo F with a PMH of Migraine and C-Spine Fusion (s/p fall) who is now presenting as a transfer from [facility] after developing right sided weakness 2/2 fall down stairs. Pt notes that she was walking down the stairs and slipped, fell forward on her knees and landed with the front of her head into the door at the bottom of the stairs. When she tried to get up she could not due to the right sided weakness and the left hip pain, associated is a right sided numbness. Pt denied any LOC from, shaking, urinary/bowel loss or tongue biting. Pt notes similar prior episode when she fell last time, only at that point her left side was weak, and only improved after spinal surgery.     After admission, MRI brain, MRI C spine, rEEG and Hip Xr were all negative for any new objective findings.  Prior surgery in c spine and cord signal change noted (chronic)   Patient was evaluated by orthopedic surgery who recommended a MRI of her left hip which was negative.     Weakness on the right side continued to improve slightly. She was evaluated by physical therapy who recommended acute rehab.   Her pain regimen was improved so that she  no longer requires IV pain medications. She was able to walk with a walker on the day of discharge.     Will f/u with outpt neurologist Dr Santos.

## 2018-04-20 LAB — ERYTHROCYTE [SEDIMENTATION RATE] IN BLOOD: 17 MM/HR — HIGH (ref 0–15)

## 2018-04-20 PROCEDURE — 99232 SBSQ HOSP IP/OBS MODERATE 35: CPT

## 2018-04-20 RX ORDER — ACETAMINOPHEN 500 MG
1000 TABLET ORAL ONCE
Qty: 0 | Refills: 0 | Status: COMPLETED | OUTPATIENT
Start: 2018-04-20 | End: 2018-04-20

## 2018-04-20 RX ORDER — KETOROLAC TROMETHAMINE 30 MG/ML
15 SYRINGE (ML) INJECTION ONCE
Qty: 0 | Refills: 0 | Status: DISCONTINUED | OUTPATIENT
Start: 2018-04-20 | End: 2018-04-20

## 2018-04-20 RX ORDER — LIDOCAINE 4 G/100G
1 CREAM TOPICAL ONCE
Qty: 0 | Refills: 0 | Status: COMPLETED | OUTPATIENT
Start: 2018-04-20 | End: 2018-04-20

## 2018-04-20 RX ADMIN — Medication 15 MILLIGRAM(S): at 12:15

## 2018-04-20 RX ADMIN — Medication 50 MILLIGRAM(S): at 17:45

## 2018-04-20 RX ADMIN — Medication 400 MILLIGRAM(S): at 00:17

## 2018-04-20 RX ADMIN — Medication 1000 MILLIGRAM(S): at 20:35

## 2018-04-20 RX ADMIN — Medication 1000 MILLIGRAM(S): at 01:00

## 2018-04-20 RX ADMIN — Medication 400 MILLIGRAM(S): at 19:56

## 2018-04-20 RX ADMIN — Medication 15 MILLIGRAM(S): at 04:05

## 2018-04-20 RX ADMIN — Medication 50 MILLIGRAM(S): at 05:53

## 2018-04-20 RX ADMIN — PANTOPRAZOLE SODIUM 40 MILLIGRAM(S): 20 TABLET, DELAYED RELEASE ORAL at 05:53

## 2018-04-20 RX ADMIN — ENOXAPARIN SODIUM 40 MILLIGRAM(S): 100 INJECTION SUBCUTANEOUS at 17:46

## 2018-04-20 RX ADMIN — LIDOCAINE 1 PATCH: 4 CREAM TOPICAL at 21:33

## 2018-04-20 RX ADMIN — Medication 15 MILLIGRAM(S): at 22:09

## 2018-04-20 RX ADMIN — Medication 15 MILLIGRAM(S): at 11:45

## 2018-04-20 RX ADMIN — LIDOCAINE 1 PATCH: 4 CREAM TOPICAL at 12:53

## 2018-04-20 RX ADMIN — Medication 15 MILLIGRAM(S): at 13:23

## 2018-04-20 RX ADMIN — Medication 15 MILLIGRAM(S): at 13:53

## 2018-04-20 RX ADMIN — Medication 15 MILLIGRAM(S): at 21:27

## 2018-04-20 NOTE — PROGRESS NOTE ADULT - ASSESSMENT
Pt is a 36 yo F with a PMH of Migraine and C-Spine Fusion (s/p fall) who is now presenting as a transfer from [facility] after developing right sided weakness 2/2 fall down stairs. On exam pt has significant right sided weakness which is possibly effort dependent. MRI brain and MRA C spine are negative for any pathology. rEEG negative for seizure events. Normal CK.    Right sided hemiparesis s/p fall, unclear etiology  Left hip pain, xray negative    Plan:  Pain control, toradol for breakthrough, responding to IV tylenol.  PM&R recommending home with outpatient PT  Psych ruled out conversion disorder Pt is a 36 yo F with a PMH of Migraine and C-Spine Fusion (s/p fall) who is now presenting as a transfer from [facility] after developing right sided weakness 2/2 fall down stairs. On exam pt has significant right sided weakness which is possibly effort dependent. MRI brain and MRA C spine are negative for any pathology. rEEG negative for seizure events. Normal CK.    Right sided hemiparesis s/p fall, unclear etiology  Left hip pain, xray negative  Nurse reports pt moves her right arm when being cleaned.     Plan:  Pain control, Toradol for breakthrough, responding to IV Tylenol  Start lidocaine patch for pain   PM&R recommending home with outpatient PT  Psych ruled out conversion disorder , need to FU with psych  Ortho consulted - no further workup, pain control

## 2018-04-20 NOTE — PROGRESS NOTE ADULT - SUBJECTIVE AND OBJECTIVE BOX
37y Female community ambulator w/o assistive devices presents c/o L hip pain sp mechanical fall. Patient transfered from [facility] after developing right sided weakness 2/2 fall down stairs (~6 steps).  Pt notes that she was walking down the stairs and slipped, fell forward on her knees and landed with the front of her head into the door at the bottom of the stairs. When she tried to get up she could not due to the right sided weakness and the left hip pain, associated is a right sided numbness. Neurosurgery evaluated patient, who has hx of C3-C5 ACDF, and said no surgical intervention as MRI head and C-Spine show mild right disc herniation without cord compression which does not explain her right sided hemiplegia.     Patient has remained on Neurology service, who is now consulting us for evaluation of Left hip pain. Patient presently denying numbness/tingling. Denies fever/chills. Patient walking with Physical therapy.    PAST MEDICAL & SURGICAL HISTORY:  No pertinent past medical history  S/P cervical spinal fusion: C3-C5  No significant past surgical history    MEDICATIONS  (STANDING):  enoxaparin Injectable 40 milliGRAM(s) SubCutaneous every 24 hours  influenza   Vaccine 0.5 milliLiter(s) IntraMuscular once  ketorolac   Injectable 15 milliGRAM(s) IV Push every 8 hours  pantoprazole    Tablet 40 milliGRAM(s) Oral before breakfast  topiramate 50 milliGRAM(s) Oral two times a day    Allergies    aciditic foods (Rash)  chocolate (Urticaria; Rash)  No Known Drug Allergies    Intolerances      Vital Signs Last 24 Hrs  T(C): 36.7 (04-20-18 @ 05:15), Max: 36.9 (04-19-18 @ 23:43)  T(F): 98.1 (04-20-18 @ 05:15), Max: 98.4 (04-19-18 @ 23:43)  HR: 76 (04-20-18 @ 05:15) (69 - 98)  BP: 95/62 (04-20-18 @ 05:15) (95/62 - 104/74)  BP(mean): --  RR: 18 (04-20-18 @ 05:15) (18 - 18)  SpO2: 100% (04-20-18 @ 05:15) (98% - 100%)    Imaging: XRs unremarkable    Physical Exam  Gen: NAD  LLE: skin intact, able to SLR to 10 degrees, negative log roll, negative ttp anterior groin, mild ttp over Left greater trochanter. +ehl/fhl/ta/gs function, no calf ttp, dp/pt pulse intact, compartments soft, Full painless passive range of motion  RLE: skin intact, externally rotated, no tenderness, moving toes but no flexion/extension at hip/knee. Strong pulses    A/P: 37y Female with unremarkable left hip films and physical exam with full painless passive range of motion. Patient not experiencing conversino disorder per Psych but neurology team remains suspicious. Unclear etiology of left hip pain but xrays show no acute fractures.    - Pain control  - WBAT  - no further imaging recommended at this time  - no acute orthopedic intervention  - Will discuss with attending

## 2018-04-20 NOTE — PROGRESS NOTE ADULT - ATTENDING COMMENTS
Patient seen and examined - exam Is variable - unable to raise RUE independently but able to protect self when arm is raised by examiner. Took a few steps with PT.  Will d/w psych and pmr  complaining of persistent left hip pain - xray unremarkable.  Will ask ortho to evaluate

## 2018-04-20 NOTE — PROGRESS NOTE ADULT - SUBJECTIVE AND OBJECTIVE BOX
Neurology Consult Note    Interval history  -No overnight events. Still with minimal movement of the right side. PM&R recommends home with home PT. Psych ruled out  conversion disorder.     MEDICATIONS  (STANDING):  enoxaparin Injectable 40 milliGRAM(s) SubCutaneous every 24 hours  influenza   Vaccine 0.5 milliLiter(s) IntraMuscular once  ketorolac   Injectable 15 milliGRAM(s) IV Push every 8 hours  pantoprazole    Tablet 40 milliGRAM(s) Oral before breakfast  topiramate 50 milliGRAM(s) Oral two times a day    MEDICATIONS  (PRN):  acetaminophen   Tablet 1000 milliGRAM(s) Oral every 8 hours PRN severe pain    Vital Signs Last 24 Hrs  T(C): 36.7 (20 Apr 2018 05:15), Max: 36.9 (19 Apr 2018 23:43)  T(F): 98.1 (20 Apr 2018 05:15), Max: 98.4 (19 Apr 2018 23:43)  HR: 76 (20 Apr 2018 05:15) (69 - 98)  BP: 95/62 (20 Apr 2018 05:15) (95/62 - 104/74)  BP(mean): --  RR: 18 (20 Apr 2018 05:15) (18 - 18)  SpO2: 100% (20 Apr 2018 05:15) (98% - 100%)    	Neurological Exam:  	Mental Status: Orientated to self, date and place.  Attention intact.  No dysarthria. Speech fluent.  	Cranial Nerves: PERRL, EOMI, VFF, no nystagmus.    CN V1-3 intact to light touch b/l.  No facial asymmetry.  Hearing intact to finger rub bilaterally.  Tongue, uvula and palate midline.  Sternocleidomastoid and Trapezius intact bilaterally.    	Motor:   	Tone: normal.  	Strength:  0/5 in the RUE proximally, has trace finger movements in right hand, 0/5 in the RLE, 5/5 in the LUE and LLE  	Drift: Does not open   	Dysmetria: None to finger-nose-finger on the left  	No truncal ataxia.    	Tremor: No resting, postural or action tremor.  No myoclonus.    	Sensation: intact to temp throughout, inconsistent to LT and pinprick throughout    	Deep Tendon Reflexes:               Biceps        BR        Patellar        Ankle       Babinski  	                                          R        2+             1+        1+               1+           mute                                              L         2+             1+        1+               1+           down    Labs    Creatine Kinase, Serum (04.19.18 @ 13:11)    Creatine Kinase, Serum: 60 U/L        Imaging    < from: Xray Hip 2-3 Views, Left (04.18.18 @ 11:15) >  FINDINGS/  IMPRESSION:    There is no acute fracture or dislocation of the left hip. Normal bone   mineralization and osseous trabecular pattern. The joint spaces are   maintained.     < end of copied text >      Unremarkable CTA of the head and neck. No evidence for carotid or   vertebral dissection.    MRI of the brain was performed using sagittal T1, axial SPGR, T2, FLAIR   diffusion and susceptibility weighted sequence.The lateral ventricles have a normal configuration and There is no evidence of acute hemorrhage, mass mass effect or abnormal signal in posterior fossa supratentorial regioned.  Impression: No evidence of acute hemorrhage, mass or mass effect.    MRI cervical spine There is evidence of postop changes compatible with anterior cervical   spine fusion. This extends from C3 to C5. The metallic hardware does   appear to be adequately placed.    C2-3: Disc bulge is identified. There is abnormal T2 prolongation   associated this disc bulge which could be compatible with underlying   annular tear. Clinical correlation continued close interval follow-up is   recommended.    C3-4: Normal.     C4-5: Hypertrophic change is seen involving the right uncovertebral   joint. Mild to moderate narrowing of the right neural foramen is seen.    C5-6: Disc bulge and bilateral hypertrophic facet joint changes seen. No   significant, minus of the spinal canal or either neural foramen.    C6-7: Normal    C7-T1: Normal    Spinal cord demonstrates normal signal and caliber.    Postop changes are identified.    Degenerative changes as described.    Disc bulge is seen at the C2-3 level with associated T2 prolongation as   described above.

## 2018-04-21 LAB — ANA TITR SER: NEGATIVE — SIGNIFICANT CHANGE UP

## 2018-04-21 PROCEDURE — 99232 SBSQ HOSP IP/OBS MODERATE 35: CPT

## 2018-04-21 RX ORDER — DIAZEPAM 5 MG
5 TABLET ORAL ONCE
Qty: 0 | Refills: 0 | Status: DISCONTINUED | OUTPATIENT
Start: 2018-04-21 | End: 2018-04-22

## 2018-04-21 RX ORDER — KETOROLAC TROMETHAMINE 30 MG/ML
10 SYRINGE (ML) INJECTION EVERY 6 HOURS
Qty: 0 | Refills: 0 | Status: DISCONTINUED | OUTPATIENT
Start: 2018-04-21 | End: 2018-04-23

## 2018-04-21 RX ORDER — TRAMADOL HYDROCHLORIDE 50 MG/1
25 TABLET ORAL EVERY 12 HOURS
Qty: 0 | Refills: 0 | Status: DISCONTINUED | OUTPATIENT
Start: 2018-04-21 | End: 2018-04-23

## 2018-04-21 RX ORDER — ACETAMINOPHEN 500 MG
1000 TABLET ORAL ONCE
Qty: 0 | Refills: 0 | Status: COMPLETED | OUTPATIENT
Start: 2018-04-21 | End: 2018-04-21

## 2018-04-21 RX ADMIN — Medication 10 MILLIGRAM(S): at 11:11

## 2018-04-21 RX ADMIN — Medication 10 MILLIGRAM(S): at 18:08

## 2018-04-21 RX ADMIN — Medication 10 MILLIGRAM(S): at 13:23

## 2018-04-21 RX ADMIN — Medication 400 MILLIGRAM(S): at 14:54

## 2018-04-21 RX ADMIN — Medication 10 MILLIGRAM(S): at 23:19

## 2018-04-21 RX ADMIN — ENOXAPARIN SODIUM 40 MILLIGRAM(S): 100 INJECTION SUBCUTANEOUS at 18:08

## 2018-04-21 RX ADMIN — PANTOPRAZOLE SODIUM 40 MILLIGRAM(S): 20 TABLET, DELAYED RELEASE ORAL at 08:34

## 2018-04-21 RX ADMIN — Medication 50 MILLIGRAM(S): at 18:08

## 2018-04-21 RX ADMIN — Medication 50 MILLIGRAM(S): at 06:21

## 2018-04-21 RX ADMIN — Medication 15 MILLIGRAM(S): at 06:21

## 2018-04-21 RX ADMIN — Medication 1000 MILLIGRAM(S): at 15:46

## 2018-04-21 NOTE — PROGRESS NOTE ADULT - ASSESSMENT
Pt is a 36 yo F with a PMH of Migraine and C-Spine Fusion (s/p fall) who is now presenting as a transfer from [facility] after developing right sided weakness 2/2 fall down stairs. On exam pt has significant right sided weakness which is possibly effort dependent. MRI brain and MRA C spine are negative for any pathology. rEEG negative for seizure events. Normal CK.    Right sided hemiparesis s/p fall, unclear etiology  Left hip pain, xray negative  Nurse reports pt moves her right arm when being cleaned.   Evaluated by psychiatry and they do not feel she meets diagnostic criteria for conversion disorder.   Orthopedics assessing hip pain as well.     Plan:  Pain control, Toradol for breakthrough, responding to IV Tylenol. Need to switch to PO toradol with GI prophylaxis.   Lidocaine patch for pain.   PM&R recommending home with outpatient PT. Patient feels she needs LENNIE.  Ortho recommendations appreciated, will follow up after MRI. Likely no hip pathology and ESR decreasing.

## 2018-04-21 NOTE — PROGRESS NOTE ADULT - SUBJECTIVE AND OBJECTIVE BOX
Neurology Consult Note    Interval history: Patient still complaining of significant pain. Requesting sedation for MRI.     MEDICATIONS  (STANDING):  diazepam    Tablet 5 milliGRAM(s) Oral once  enoxaparin Injectable 40 milliGRAM(s) SubCutaneous every 24 hours  influenza   Vaccine 0.5 milliLiter(s) IntraMuscular once  ketorolac   Injectable 15 milliGRAM(s) IV Push every 8 hours  pantoprazole    Tablet 40 milliGRAM(s) Oral before breakfast  topiramate 50 milliGRAM(s) Oral two times a day    MEDICATIONS  (PRN):  acetaminophen   Tablet 1000 milliGRAM(s) Oral every 8 hours PRN severe pain      Vital Signs Last 24 Hrs  T(C): 36.6 (21 Apr 2018 04:16), Max: 36.8 (20 Apr 2018 16:19)  T(F): 97.8 (21 Apr 2018 04:16), Max: 98.2 (20 Apr 2018 16:19)  HR: 74 (21 Apr 2018 04:16) (74 - 84)  BP: 97/62 (21 Apr 2018 04:16) (96/60 - 98/60)  BP(mean): --  RR: 18 (21 Apr 2018 04:16) (18 - 18)  SpO2: 100% (21 Apr 2018 04:16) (99% - 100%)    	Neurological Exam:  	Mental Status: Orientated to self, date and place.  Attention intact.  No dysarthria. Speech fluent.  	Cranial Nerves: PERRL, EOMI, VFF, no nystagmus.    CN V1-3 intact to light touch b/l.  No facial asymmetry.  Hearing intact to finger rub bilaterally.  Tongue, uvula and palate midline.  Sternocleidomastoid and Trapezius intact bilaterally.    	Motor:   	Tone: normal.  	Strength:  0/5 in the RUE proximally, has trace finger movements in right hand, 0/5 in the RLE, 5/5 in the LUE and LLE  	Drift: Does not open   	Dysmetria: None to finger-nose-finger on the left  	No truncal ataxia.    	Tremor: No resting, postural or action tremor.  No myoclonus.    	Sensation: intact to temp throughout, inconsistent to LT and pinprick throughout    	Deep Tendon Reflexes:               Biceps        BR        Patellar        Ankle       Babinski  	                                          R        2+             1+        1+               1+           mute                                              L         2+             1+        1+               1+           down    Labs    Sedimentation Rate, Erythrocyte (04.20.18 @ 13:39)    Sedimentation Rate, Erythrocyte: 17 mm/hr      Creatine Kinase, Serum (04.19.18 @ 13:11)    Creatine Kinase, Serum: 60 U/L      Imaging    < from: Xray Hip 2-3 Views, Left (04.18.18 @ 11:15) >  FINDINGS/  IMPRESSION:    There is no acute fracture or dislocation of the left hip. Normal bone   mineralization and osseous trabecular pattern. The joint spaces are   maintained.     < end of copied text >      Unremarkable CTA of the head and neck. No evidence for carotid or   vertebral dissection.    MRI of the brain was performed using sagittal T1, axial SPGR, T2, FLAIR   diffusion and susceptibility weighted sequence.The lateral ventricles have a normal configuration and There is no evidence of acute hemorrhage, mass mass effect or abnormal signal in posterior fossa supratentorial regioned.  Impression: No evidence of acute hemorrhage, mass or mass effect.    MRI cervical spine There is evidence of postop changes compatible with anterior cervical   spine fusion. This extends from C3 to C5. The metallic hardware does   appear to be adequately placed.    C2-3: Disc bulge is identified. There is abnormal T2 prolongation   associated this disc bulge which could be compatible with underlying   annular tear. Clinical correlation continued close interval follow-up is   recommended.    C3-4: Normal.     C4-5: Hypertrophic change is seen involving the right uncovertebral   joint. Mild to moderate narrowing of the right neural foramen is seen.    C5-6: Disc bulge and bilateral hypertrophic facet joint changes seen. No   significant, minus of the spinal canal or either neural foramen.    C6-7: Normal    C7-T1: Normal    Spinal cord demonstrates normal signal and caliber.    Postop changes are identified.    Degenerative changes as described.    Disc bulge is seen at the C2-3 level with associated T2 prolongation as   described above. Neurology Consult Note    Interval history: Patient still complaining of pain, however improved. Requesting sedation for MRI.     MEDICATIONS  (STANDING):  diazepam    Tablet 5 milliGRAM(s) Oral once  enoxaparin Injectable 40 milliGRAM(s) SubCutaneous every 24 hours  influenza   Vaccine 0.5 milliLiter(s) IntraMuscular once  ketorolac 10 milliGRAM(s) Oral every 6 hours  pantoprazole    Tablet 40 milliGRAM(s) Oral before breakfast  topiramate 50 milliGRAM(s) Oral two times a day    Vital Signs Last 24 Hrs  T(C): 36.7 (21 Apr 2018 08:37), Max: 36.8 (20 Apr 2018 16:19)  T(F): 98 (21 Apr 2018 08:37), Max: 98.2 (20 Apr 2018 16:19)  HR: 79 (21 Apr 2018 08:37) (74 - 84)  BP: 90/55 (21 Apr 2018 08:37) (90/55 - 98/60)  BP(mean): --  RR: 18 (21 Apr 2018 08:37) (18 - 18)  SpO2: 100% (21 Apr 2018 08:37) (99% - 100%)    	Neurological Exam:  	Mental Status: Orientated to self, date and place.  Attention intact.  No dysarthria. Speech fluent.  	Cranial Nerves: PERRL, EOMI, VFF, no nystagmus.    CN V1-3 intact to light touch b/l.  No facial asymmetry.  Hearing intact to finger rub bilaterally.  Tongue, uvula and palate midline.  Sternocleidomastoid and Trapezius intact bilaterally.    	Motor:   	Tone: normal.  	Strength:  0/5 in the RUE proximally, has trace finger movements in right hand, 0/5 in the RLE, 5/5 in the LUE and LLE  	Drift: Does not open   	Dysmetria: None to finger-nose-finger on the left  	No truncal ataxia.    	Tremor: No resting, postural or action tremor.  No myoclonus.    	Sensation: intact to temp throughout, inconsistent to LT and pinprick throughout    	Deep Tendon Reflexes:               Biceps        BR        Patellar        Ankle       Babinski  	                                          R        2+             1+        1+               1+           mute                                              L         2+             1+        1+               1+           down    Labs    Sedimentation Rate, Erythrocyte (04.20.18 @ 13:39)    Sedimentation Rate, Erythrocyte: 17 mm/hr      Creatine Kinase, Serum (04.19.18 @ 13:11)    Creatine Kinase, Serum: 60 U/L      Imaging    < from: Xray Hip 2-3 Views, Left (04.18.18 @ 11:15) >  FINDINGS/  IMPRESSION:    There is no acute fracture or dislocation of the left hip. Normal bone   mineralization and osseous trabecular pattern. The joint spaces are   maintained.     < end of copied text >      Unremarkable CTA of the head and neck. No evidence for carotid or   vertebral dissection.    MRI of the brain was performed using sagittal T1, axial SPGR, T2, FLAIR   diffusion and susceptibility weighted sequence.The lateral ventricles have a normal configuration and There is no evidence of acute hemorrhage, mass mass effect or abnormal signal in posterior fossa supratentorial regioned.  Impression: No evidence of acute hemorrhage, mass or mass effect.    MRI cervical spine There is evidence of postop changes compatible with anterior cervical   spine fusion. This extends from C3 to C5. The metallic hardware does   appear to be adequately placed.    C2-3: Disc bulge is identified. There is abnormal T2 prolongation   associated this disc bulge which could be compatible with underlying   annular tear. Clinical correlation continued close interval follow-up is   recommended.    C3-4: Normal.     C4-5: Hypertrophic change is seen involving the right uncovertebral   joint. Mild to moderate narrowing of the right neural foramen is seen.    C5-6: Disc bulge and bilateral hypertrophic facet joint changes seen. No   significant, minus of the spinal canal or either neural foramen.    C6-7: Normal    C7-T1: Normal    Spinal cord demonstrates normal signal and caliber.    Postop changes are identified.    Degenerative changes as described.    Disc bulge is seen at the C2-3 level with associated T2 prolongation as   described above.

## 2018-04-21 NOTE — CONSULT NOTE ADULT - SUBJECTIVE AND OBJECTIVE BOX
37y Female community ambulator w/o assistive devices presents c/o L hip pain sp mechanical fall. Patient transfered from [facility] after developing right sided weakness 2/2 fall down stairs (~6 steps).  Pt notes that she was walking down the stairs and slipped, fell forward on her knees and landed with the front of her head into the door at the bottom of the stairs. When she tried to get up she could not due to the right sided weakness and the left hip pain, associated is a right sided numbness. Neurosurgery evaluated patient, who has hx of C3-C5 ACDF, and said no surgical intervention as MRI head and C-Spine show mild right disc herniation without cord compression which does not explain her right sided hemiplegia.     Patient has remained on Neurology service, who is now consulting us for evaluation of Left hip pain. Patient presently denying numbness/tingling. Denies fever/chills. Patient walking with Physical therapy.    PAST MEDICAL & SURGICAL HISTORY:  No pertinent past medical history  S/P cervical spinal fusion: C3-C5  No significant past surgical history    MEDICATIONS  (STANDING):  enoxaparin Injectable 40 milliGRAM(s) SubCutaneous every 24 hours  influenza   Vaccine 0.5 milliLiter(s) IntraMuscular once  ketorolac   Injectable 15 milliGRAM(s) IV Push every 8 hours  pantoprazole    Tablet 40 milliGRAM(s) Oral before breakfast  topiramate 50 milliGRAM(s) Oral two times a day    Allergies    aciditic foods (Rash)  chocolate (Urticaria; Rash)  No Known Drug Allergies    Intolerances      Vital Signs Last 24 Hrs  T(C): 36.7 (04-20-18 @ 05:15), Max: 36.9 (04-19-18 @ 23:43)  T(F): 98.1 (04-20-18 @ 05:15), Max: 98.4 (04-19-18 @ 23:43)  HR: 76 (04-20-18 @ 05:15) (69 - 98)  BP: 95/62 (04-20-18 @ 05:15) (95/62 - 104/74)  BP(mean): --  RR: 18 (04-20-18 @ 05:15) (18 - 18)  SpO2: 100% (04-20-18 @ 05:15) (98% - 100%)    Imaging: XRs unremarkable    Physical Exam  Gen: NAD  LLE: skin intact, able to SLR to 10 degrees, negative log roll, negative ttp anterior groin, mild ttp over Left greater trochanter. +ehl/fhl/ta/gs function, no calf ttp, dp/pt pulse intact, compartments soft, Full painless passive range of motion  RLE: skin intact, externally rotated, no tenderness, moving toes but no flexion/extension at hip/knee. Strong pulses    A/P: 37y Female with unremarkable left hip films and physical exam with full painless passive range of motion. Patient not experiencing conversino disorder per Psych but neurology team remains suspicious. Unclear etiology of left hip pain but xrays show no acute fractures.    - Pain control  - WBAT  - obtain MRI L hip  - no acute orthopedic intervention  - Will discuss with attending

## 2018-04-22 PROCEDURE — 72195 MRI PELVIS W/O DYE: CPT | Mod: 26

## 2018-04-22 PROCEDURE — 73721 MRI JNT OF LWR EXTRE W/O DYE: CPT | Mod: 26,LT

## 2018-04-22 PROCEDURE — 99232 SBSQ HOSP IP/OBS MODERATE 35: CPT

## 2018-04-22 RX ORDER — ACETAMINOPHEN 500 MG
1000 TABLET ORAL ONCE
Qty: 0 | Refills: 0 | Status: COMPLETED | OUTPATIENT
Start: 2018-04-22 | End: 2018-04-22

## 2018-04-22 RX ORDER — BACLOFEN 100 %
10 POWDER (GRAM) MISCELLANEOUS ONCE
Qty: 0 | Refills: 0 | Status: COMPLETED | OUTPATIENT
Start: 2018-04-22 | End: 2018-04-22

## 2018-04-22 RX ADMIN — Medication 10 MILLIGRAM(S): at 12:10

## 2018-04-22 RX ADMIN — Medication 10 MILLIGRAM(S): at 10:46

## 2018-04-22 RX ADMIN — Medication 1000 MILLIGRAM(S): at 08:27

## 2018-04-22 RX ADMIN — Medication 10 MILLIGRAM(S): at 23:37

## 2018-04-22 RX ADMIN — TRAMADOL HYDROCHLORIDE 25 MILLIGRAM(S): 50 TABLET ORAL at 05:20

## 2018-04-22 RX ADMIN — Medication 50 MILLIGRAM(S): at 17:50

## 2018-04-22 RX ADMIN — Medication 10 MILLIGRAM(S): at 00:03

## 2018-04-22 RX ADMIN — TRAMADOL HYDROCHLORIDE 25 MILLIGRAM(S): 50 TABLET ORAL at 16:46

## 2018-04-22 RX ADMIN — TRAMADOL HYDROCHLORIDE 25 MILLIGRAM(S): 50 TABLET ORAL at 17:52

## 2018-04-22 RX ADMIN — Medication 50 MILLIGRAM(S): at 06:01

## 2018-04-22 RX ADMIN — ENOXAPARIN SODIUM 40 MILLIGRAM(S): 100 INJECTION SUBCUTANEOUS at 21:40

## 2018-04-22 RX ADMIN — Medication 10 MILLIGRAM(S): at 17:50

## 2018-04-22 RX ADMIN — TRAMADOL HYDROCHLORIDE 25 MILLIGRAM(S): 50 TABLET ORAL at 04:22

## 2018-04-22 RX ADMIN — Medication 1000 MILLIGRAM(S): at 21:00

## 2018-04-22 RX ADMIN — Medication 400 MILLIGRAM(S): at 20:25

## 2018-04-22 RX ADMIN — Medication 5 MILLIGRAM(S): at 17:50

## 2018-04-22 RX ADMIN — PANTOPRAZOLE SODIUM 40 MILLIGRAM(S): 20 TABLET, DELAYED RELEASE ORAL at 06:02

## 2018-04-22 RX ADMIN — Medication 10 MILLIGRAM(S): at 12:40

## 2018-04-22 RX ADMIN — Medication 10 MILLIGRAM(S): at 06:01

## 2018-04-22 NOTE — PROGRESS NOTE ADULT - ASSESSMENT
Pt is a 38 yo F with a PMH of Migraine and C-Spine Fusion (s/p fall) who is now presenting as a transfer from [facility] after developing right sided weakness 2/2 fall down stairs. On exam pt has significant right sided weakness which is possibly effort dependent. MRI brain and MRA C spine are negative for any pathology. rEEG negative for seizure events. Normal CK.    Right sided hemiparesis s/p fall, unclear etiology  Left hip pain, xray negative  Nurse reports pt moves her right arm when being cleaned.   Evaluated by psychiatry and they do not feel she meets diagnostic criteria for conversion disorder.   Orthopedics assessing hip pain as well.     Plan:  Pain control, Toradol for breakthrough, responding to IV Tylenol. Need to switch to PO toradol with GI prophylaxis. consider muscle relaxant   Lidocaine patch for pain.   PM&R recommending home with outpatient PT. Patient feels she needs LENNIE.  Ortho recommendations appreciated, will follow up after MRI. Likely no hip pathology and ESR decreasing. Pt is a 38 yo F with a PMH of Migraine and C-Spine Fusion (s/p fall) who is now presenting as a transfer from [facility] after developing right sided weakness 2/2 fall down stairs. On exam pt has significant right sided weakness which is possibly effort dependent. MRI brain and MRA C spine are negative for any pathology. rEEG negative for seizure events. Normal CK.    Right sided hemiparesis s/p fall,exam appears to be functional  Left hip pain, xray negative  Nurse reports pt moves her right arm when being cleaned.   Evaluated by psychiatry and they do not feel she meets diagnostic criteria for conversion disorder.   Orthopedics assessing hip pain as well.     Plan:  Pain control, Tramadol for breakthrough, PO toradol with GI prophylaxis. consider muscle relaxant   Lidocaine patch for pain.   PM&R recommending home with outpatient PT. Patient feels she needs LENNIE.  Ortho recommendations appreciated, will follow up after MRI. Likely no hip pathology and ESR decreasing.

## 2018-04-22 NOTE — PROGRESS NOTE ADULT - SUBJECTIVE AND OBJECTIVE BOX
Neurology Consult Note    Interval history: Patient still complaining of pain, particuarly in left lower back at times moving down to left knee    MEDICATIONS  (STANDING):  diazepam    Tablet 5 milliGRAM(s) Oral once  enoxaparin Injectable 40 milliGRAM(s) SubCutaneous every 24 hours  influenza   Vaccine 0.5 milliLiter(s) IntraMuscular once  ketorolac 10 milliGRAM(s) Oral every 6 hours  pantoprazole    Tablet 40 milliGRAM(s) Oral before breakfast  topiramate 50 milliGRAM(s) Oral two times a day    MEDICATIONS  (PRN):  acetaminophen   Tablet 1000 milliGRAM(s) Oral every 8 hours PRN severe pain  traMADol 25 milliGRAM(s) Oral every 12 hours PRN pain    Vital Signs Last 24 Hrs  T(C): 37 (22 Apr 2018 08:22), Max: 37 (22 Apr 2018 08:22)  T(F): 98.6 (22 Apr 2018 08:22), Max: 98.6 (22 Apr 2018 08:22)  HR: 91 (22 Apr 2018 08:22) (70 - 91)  BP: 96/57 (22 Apr 2018 08:22) (96/57 - 115/76)  BP(mean): --  RR: 18 (22 Apr 2018 08:22) (18 - 18)  SpO2: 99% (22 Apr 2018 08:22) (98% - 100%)    	Neurological Exam:  	Mental Status: Orientated to self, date and place.  Attention intact.  No dysarthria. Speech fluent.  	Cranial Nerves: PERRL, EOMI, VFF, no nystagmus.    CN V1-3 intact to light touch b/l.  No facial asymmetry.  Hearing intact to finger rub bilaterally.  Tongue, uvula and palate midline.  Sternocleidomastoid and Trapezius intact bilaterally.    	Motor:   	Tone: normal.  	Strength:  0/5 in the RUE proximally, has trace finger movements in right hand, 1/5 in the RLE, giveway weakness, when lift above her head it drops to her side, no efffort able to stand on leg 5/5 in the LUE and LLE,  	Drift: Does not open   	Dysmetria: None to finger-nose-finger on the left  	No truncal ataxia.    	Tremor: No resting, postural or action tremor.  No myoclonus.    	Sensation: intact to temp throughout, inconsistent to LT and pinprick throughout    	Deep Tendon Reflexes:               Biceps        BR        Patellar        Ankle       Babinski  	                                          R        2+             1+        1+               1+           mute                                              L         2+             1+        1+               1+           down    back : llb trigger point tenderness    Labs    Sedimentation Rate, Erythrocyte (04.20.18 @ 13:39)    Sedimentation Rate, Erythrocyte: 17 mm/hr      Creatine Kinase, Serum (04.19.18 @ 13:11)    Creatine Kinase, Serum: 60 U/L      Imaging    < from: Xray Hip 2-3 Views, Left (04.18.18 @ 11:15) >  FINDINGS/  IMPRESSION:    There is no acute fracture or dislocation of the left hip. Normal bone   mineralization and osseous trabecular pattern. The joint spaces are   maintained.     < end of copied text >      Unremarkable CTA of the head and neck. No evidence for carotid or   vertebral dissection.    MRI of the brain was performed using sagittal T1, axial SPGR, T2, FLAIR   diffusion and susceptibility weighted sequence.The lateral ventricles have a normal configuration and There is no evidence of acute hemorrhage, mass mass effect or abnormal signal in posterior fossa supratentorial regioned.  Impression: No evidence of acute hemorrhage, mass or mass effect.    MRI cervical spine There is evidence of postop changes compatible with anterior cervical   spine fusion. This extends from C3 to C5. The metallic hardware does   appear to be adequately placed.    C2-3: Disc bulge is identified. There is abnormal T2 prolongation   associated this disc bulge which could be compatible with underlying   annular tear. Clinical correlation continued close interval follow-up is   recommended.    C3-4: Normal.     C4-5: Hypertrophic change is seen involving the right uncovertebral   joint. Mild to moderate narrowing of the right neural foramen is seen.    C5-6: Disc bulge and bilateral hypertrophic facet joint changes seen. No   significant, minus of the spinal canal or either neural foramen.    C6-7: Normal    C7-T1: Normal    Spinal cord demonstrates normal signal and caliber.    Postop changes are identified.    Degenerative changes as described.    Disc bulge is seen at the C2-3 level with associated T2 prolongation as   described above.

## 2018-04-22 NOTE — PHYSICAL THERAPY INITIAL EVALUATION ADULT - LEVEL OF INDEPENDENCE: SUPINE/SIT, REHAB EVAL
__________________________________________________





Subjective


Subjective Notes


still with pain, mainly RUQ, no N/V, no fevers





Objective


Vitals/I&O





Vital Signs








  Date Time  Temp Pulse Resp B/P (MAP) Pulse Ox O2 Delivery O2 Flow Rate FiO2


 


4/22/18 07:32 98.8 66 18 119/59 (79) 97   


 


4/21/18 13:53      Room Air  








Labs





Laboratory Tests








Test


  4/22/18


08:30


 


Blood Urea Nitrogen 3 


 


Creatinine 0.85 


 


Random Glucose 86 


 


Total Protein 5.9 


 


Albumin 2.6 


 


Calcium Level 8.6 


 


Alkaline Phosphatase 220 


 


Aspartate Amino Transf


(AST/SGOT) 111 


 


 


Alanine Aminotransferase


(ALT/SGPT) 155 


 


 


Total Bilirubin 0.4 


 


Sodium Level 142 


 


Potassium Level 3.4 


 


Chloride Level 110 


 


Carbon Dioxide Level 25.0 


 


Anion Gap 7 


 


Estimat Glomerular Filtration


Rate 68 


 














 Date/Time


Source Procedure


Growth Status


 


 


 4/20/18 18:34


Urine Random Urine Urine Culture - Final


NO GROWTH IN 48 HOURS. Complete








Abdomen:  Non-distended, Post-op tenderness, BS normal


Wound


Wound :  


   Wound Location:  Abdomen


   Appearance:  Clean & Dry





A/P


Assessment and Plan


s/p lap adrián, mild LFT elevation


no evidence of CBD stone or bile leak


source of pain not clear


await GI input


dizziness and palpitations per Michel Lujan MD Apr 22, 2018 12:23 moderate assist (50% patients effort)

## 2018-04-23 DIAGNOSIS — D25.9 LEIOMYOMA OF UTERUS, UNSPECIFIED: ICD-10-CM

## 2018-04-23 DIAGNOSIS — R51 HEADACHE: ICD-10-CM

## 2018-04-23 DIAGNOSIS — M25.559 PAIN IN UNSPECIFIED HIP: ICD-10-CM

## 2018-04-23 LAB
DRVVT SCREEN TO CONFIRM RATIO: SIGNIFICANT CHANGE UP
LA NT DPL PPP QL: 25.8 SEC — SIGNIFICANT CHANGE UP
NORMALIZED SCT PPP-RTO: 0.59 RATIO — SIGNIFICANT CHANGE UP (ref 0–1.16)
NORMALIZED SCT PPP-RTO: SIGNIFICANT CHANGE UP

## 2018-04-23 PROCEDURE — 99232 SBSQ HOSP IP/OBS MODERATE 35: CPT

## 2018-04-23 RX ORDER — TRAMADOL HYDROCHLORIDE 50 MG/1
25 TABLET ORAL EVERY 6 HOURS
Qty: 0 | Refills: 0 | Status: DISCONTINUED | OUTPATIENT
Start: 2018-04-23 | End: 2018-04-25

## 2018-04-23 RX ORDER — BENZOCAINE AND MENTHOL 5; 1 G/100ML; G/100ML
1 LIQUID ORAL
Qty: 0 | Refills: 0 | Status: DISCONTINUED | OUTPATIENT
Start: 2018-04-23 | End: 2018-04-25

## 2018-04-23 RX ORDER — CELECOXIB 200 MG/1
200 CAPSULE ORAL DAILY
Qty: 0 | Refills: 0 | Status: DISCONTINUED | OUTPATIENT
Start: 2018-04-23 | End: 2018-04-25

## 2018-04-23 RX ADMIN — Medication 50 MILLIGRAM(S): at 06:04

## 2018-04-23 RX ADMIN — TRAMADOL HYDROCHLORIDE 25 MILLIGRAM(S): 50 TABLET ORAL at 23:39

## 2018-04-23 RX ADMIN — CELECOXIB 200 MILLIGRAM(S): 200 CAPSULE ORAL at 11:56

## 2018-04-23 RX ADMIN — Medication 1000 MILLIGRAM(S): at 21:51

## 2018-04-23 RX ADMIN — Medication 10 MILLIGRAM(S): at 06:04

## 2018-04-23 RX ADMIN — TRAMADOL HYDROCHLORIDE 25 MILLIGRAM(S): 50 TABLET ORAL at 04:48

## 2018-04-23 RX ADMIN — PANTOPRAZOLE SODIUM 40 MILLIGRAM(S): 20 TABLET, DELAYED RELEASE ORAL at 06:04

## 2018-04-23 RX ADMIN — TRAMADOL HYDROCHLORIDE 25 MILLIGRAM(S): 50 TABLET ORAL at 11:57

## 2018-04-23 RX ADMIN — ENOXAPARIN SODIUM 40 MILLIGRAM(S): 100 INJECTION SUBCUTANEOUS at 18:27

## 2018-04-23 RX ADMIN — Medication 50 MILLIGRAM(S): at 17:39

## 2018-04-23 RX ADMIN — Medication 10 MILLIGRAM(S): at 00:15

## 2018-04-23 RX ADMIN — BENZOCAINE AND MENTHOL 1 LOZENGE: 5; 1 LIQUID ORAL at 17:39

## 2018-04-23 RX ADMIN — TRAMADOL HYDROCHLORIDE 25 MILLIGRAM(S): 50 TABLET ORAL at 05:30

## 2018-04-23 RX ADMIN — BENZOCAINE AND MENTHOL 1 LOZENGE: 5; 1 LIQUID ORAL at 19:01

## 2018-04-23 RX ADMIN — TRAMADOL HYDROCHLORIDE 25 MILLIGRAM(S): 50 TABLET ORAL at 18:16

## 2018-04-23 RX ADMIN — TRAMADOL HYDROCHLORIDE 25 MILLIGRAM(S): 50 TABLET ORAL at 11:35

## 2018-04-23 RX ADMIN — TRAMADOL HYDROCHLORIDE 25 MILLIGRAM(S): 50 TABLET ORAL at 17:39

## 2018-04-23 RX ADMIN — BENZOCAINE AND MENTHOL 1 LOZENGE: 5; 1 LIQUID ORAL at 21:50

## 2018-04-23 RX ADMIN — Medication 10 MILLIGRAM(S): at 06:35

## 2018-04-23 RX ADMIN — CELECOXIB 200 MILLIGRAM(S): 200 CAPSULE ORAL at 11:26

## 2018-04-23 NOTE — PHYSICAL THERAPY INITIAL EVALUATION ADULT - ADDITIONAL COMMENTS
4/15 CT C-spine: CT HEAD: No acute intracranial hemorrhage, mass effect, or evidence of acute displaced calvarial fracture. CT CERVICAL SPINE:No acute displaced cervical spine fracture or evidence of traumatic malalignment. CTA HEAD/NECK: Unremarkable CTA of the head and neck. No evidence for carotid or vertebral dissection.  MRI brain: no abnormalities visualized on initial read  MRI Cervical spine: mild right C4-5 lateral/foraminal disc herniation without cord compression
180.34

## 2018-04-23 NOTE — PROGRESS NOTE ADULT - SUBJECTIVE AND OBJECTIVE BOX
Neurology Consult Note    Interval history: Patient still complaining of pain, particuarly in left lower back at times moving down to left knee    MEDICATIONS  (STANDING):  enoxaparin Injectable 40 milliGRAM(s) SubCutaneous every 24 hours  influenza   Vaccine 0.5 milliLiter(s) IntraMuscular once  ketorolac 10 milliGRAM(s) Oral every 6 hours  pantoprazole    Tablet 40 milliGRAM(s) Oral before breakfast  topiramate 50 milliGRAM(s) Oral two times a day    MEDICATIONS  (PRN):  acetaminophen   Tablet 1000 milliGRAM(s) Oral every 8 hours PRN severe pain  traMADol 25 milliGRAM(s) Oral every 12 hours PRN pain      Vital Signs Last 24 Hrs  T(C): 36.8 (23 Apr 2018 07:14), Max: 36.8 (22 Apr 2018 23:37)  T(F): 98.2 (23 Apr 2018 07:14), Max: 98.3 (22 Apr 2018 23:37)  HR: 61 (23 Apr 2018 07:14) (61 - 81)  BP: 98/58 (23 Apr 2018 07:14) (98/58 - 106/66)  BP(mean): --  RR: 18 (23 Apr 2018 07:14) (18 - 18)  SpO2: 98% (23 Apr 2018 07:14) (98% - 100%)    	Neurological Exam:  	Mental Status: Orientated to self, date and place.  Attention intact.  No dysarthria. Speech fluent.  	Cranial Nerves: PERRL, EOMI, VFF, no nystagmus.    CN V1-3 intact to light touch b/l.  No facial asymmetry.  Hearing intact to finger rub bilaterally.  Tongue, uvula and palate midline.  Sternocleidomastoid and Trapezius intact bilaterally.    	Motor:   	Tone: normal.  	Strength:  0/5 in the RUE proximally, has trace finger movements in right hand, 1/5 in the RLE, giveway weakness, when lift above her head it drops to her side, no efffort able to stand on leg 5/5 in the LUE and LLE,  	Drift: Does not open   	Dysmetria: None to finger-nose-finger on the left  	No truncal ataxia.    	Tremor: No resting, postural or action tremor.  No myoclonus.    	Sensation: intact to temp throughout, inconsistent to LT and pinprick throughout    	Deep Tendon Reflexes:               Biceps        BR        Patellar        Ankle       Babinski  	                                          R        2+             1+        1+               1+           mute                                              L         2+             1+        1+               1+           down    back : llb trigger point tenderness    Labs    Sedimentation Rate, Erythrocyte (04.20.18 @ 13:39)    Sedimentation Rate, Erythrocyte: 17 mm/hr      Creatine Kinase, Serum (04.19.18 @ 13:11)    Creatine Kinase, Serum: 60 U/L      Imaging    < from: Xray Hip 2-3 Views, Left (04.18.18 @ 11:15) >  FINDINGS/  IMPRESSION:    There is no acute fracture or dislocation of the left hip. Normal bone   mineralization and osseous trabecular pattern. The joint spaces are   maintained.     < end of copied text >      Unremarkable CTA of the head and neck. No evidence for carotid or   vertebral dissection.    MRI of the brain was performed using sagittal T1, axial SPGR, T2, FLAIR   diffusion and susceptibility weighted sequence.The lateral ventricles have a normal configuration and There is no evidence of acute hemorrhage, mass mass effect or abnormal signal in posterior fossa supratentorial regioned.  Impression: No evidence of acute hemorrhage, mass or mass effect.    	  < from: MR Cervical Spine No Cont (04.15.18 @ 20:51) >  C2-3: Disc bulge is identified. There is abnormal T2 prolongation   associated this disc bulge which could be compatible with underlying   annular tear. Clinical correlation continued close interval follow-up is   recommended.    < from: MR Hip No Cont, Left (04.22.18 @ 19:39) >  There is no hip or pelvic fracture identified. There is no bone marrow   edema.    The bilateral hip joint spaces are preserved. There is a physiologic   amount of joint fluid.    The abductors, adductors, iliopsoas and hamstring muscles and tendons are   normal.    Uterine fibroids are present. There is a small amount of pelvic free   fluid which is normal for a patient of this age.    The peripheral soft tissues are normal. The neurovascular structures are   intact. Neurology Consult Note    Interval history: Patient still complaining of pain, particuarly in left lower back at times moving down to left knee    MEDICATIONS  (STANDING):  enoxaparin Injectable 40 milliGRAM(s) SubCutaneous every 24 hours  influenza   Vaccine 0.5 milliLiter(s) IntraMuscular once  ketorolac 10 milliGRAM(s) Oral every 6 hours  pantoprazole    Tablet 40 milliGRAM(s) Oral before breakfast  topiramate 50 milliGRAM(s) Oral two times a day    MEDICATIONS  (PRN):  acetaminophen   Tablet 1000 milliGRAM(s) Oral every 8 hours PRN severe pain  traMADol 25 milliGRAM(s) Oral every 12 hours PRN pain      Vital Signs Last 24 Hrs  T(C): 36.8 (23 Apr 2018 07:14), Max: 36.8 (22 Apr 2018 23:37)  T(F): 98.2 (23 Apr 2018 07:14), Max: 98.3 (22 Apr 2018 23:37)  HR: 61 (23 Apr 2018 07:14) (61 - 81)  BP: 98/58 (23 Apr 2018 07:14) (98/58 - 106/66)  BP(mean): --  RR: 18 (23 Apr 2018 07:14) (18 - 18)  SpO2: 98% (23 Apr 2018 07:14) (98% - 100%)    	Neurological Exam:  	Mental Status: Orientated to self, date and place.  Attention intact.  No dysarthria. Speech fluent.  	Cranial Nerves: PERRL, EOMI, VFF, no nystagmus.    CN V1-3 intact to light touch b/l.  No facial asymmetry.  Hearing intact to finger rub bilaterally.  Tongue, uvula and palate midline.  Sternocleidomastoid and Trapezius intact bilaterally.    	Motor:   	Tone: normal.  	Strength:  0/5 in the RUE proximally, has trace finger movements in right hand, 1/5 in the RLE, giveway weakness, when lift above her head it drops to her side, no efffort able to stand on leg 5/5 in the LUE and LLE,  	Drift: Does not open   	Dysmetria: None to finger-nose-finger on the left  	No truncal ataxia.    	Tremor: No resting, postural or action tremor.  No myoclonus.    	Sensation: intact to temp throughout, inconsistent to LT and pinprick throughout    	Deep Tendon Reflexes:               Biceps        BR        Patellar        Ankle       Babinski  	                                          R        2+             1+        1+               1+           mute                                              L         2+             1+        1+               1+           down  gait: Able to stand with 2 person assistance. Able to take a few steps, antalgic      back : llb trigger point tenderness    Labs    Sedimentation Rate, Erythrocyte (04.20.18 @ 13:39)    Sedimentation Rate, Erythrocyte: 17 mm/hr      Creatine Kinase, Serum (04.19.18 @ 13:11)    Creatine Kinase, Serum: 60 U/L      Imaging    < from: Xray Hip 2-3 Views, Left (04.18.18 @ 11:15) >  FINDINGS/  IMPRESSION:    There is no acute fracture or dislocation of the left hip. Normal bone   mineralization and osseous trabecular pattern. The joint spaces are   maintained.     < end of copied text >      Unremarkable CTA of the head and neck. No evidence for carotid or   vertebral dissection.    MRI of the brain was performed using sagittal T1, axial SPGR, T2, FLAIR   diffusion and susceptibility weighted sequence.The lateral ventricles have a normal configuration and There is no evidence of acute hemorrhage, mass mass effect or abnormal signal in posterior fossa supratentorial regioned.  Impression: No evidence of acute hemorrhage, mass or mass effect.    	  < from: MR Cervical Spine No Cont (04.15.18 @ 20:51) >  C2-3: Disc bulge is identified. There is abnormal T2 prolongation   associated this disc bulge which could be compatible with underlying   annular tear. Clinical correlation continued close interval follow-up is   recommended.    < from: MR Hip No Cont, Left (04.22.18 @ 19:39) >  There is no hip or pelvic fracture identified. There is no bone marrow   edema.    The bilateral hip joint spaces are preserved. There is a physiologic   amount of joint fluid.    The abductors, adductors, iliopsoas and hamstring muscles and tendons are   normal.    Uterine fibroids are present. There is a small amount of pelvic free   fluid which is normal for a patient of this age.    The peripheral soft tissues are normal. The neurovascular structures are   intact. Neurology Consult Note    Interval history: Patient still complaining of pain, particularly in left lower back/hip at times moving down to left knee.  lancinating at times, sore.  OOB with assist and with rehab over weekend    MEDICATIONS  (STANDING):  enoxaparin Injectable 40 milliGRAM(s) SubCutaneous every 24 hours  influenza   Vaccine 0.5 milliLiter(s) IntraMuscular once  ketorolac 10 milliGRAM(s) Oral every 6 hours  pantoprazole    Tablet 40 milliGRAM(s) Oral before breakfast  topiramate 50 milliGRAM(s) Oral two times a day    MEDICATIONS  (PRN):  acetaminophen   Tablet 1000 milliGRAM(s) Oral every 8 hours PRN severe pain  traMADol 25 milliGRAM(s) Oral every 12 hours PRN pain      Vital Signs Last 24 Hrs  T(C): 36.8 (23 Apr 2018 07:14), Max: 36.8 (22 Apr 2018 23:37)  T(F): 98.2 (23 Apr 2018 07:14), Max: 98.3 (22 Apr 2018 23:37)  HR: 61 (23 Apr 2018 07:14) (61 - 81)  BP: 98/58 (23 Apr 2018 07:14) (98/58 - 106/66)  RR: 18 (23 Apr 2018 07:14) (18 - 18)  SpO2: 98% (23 Apr 2018 07:14) (98% - 100%)    	Neurological Exam:  	Mental Status: Orientated to self, date and place.  Attention intact.  No dysarthria. Speech fluent.  	Cranial Nerves: PERRL, EOMI, VFF, no nystagmus.    CN V1-3 intact to light touch b/l.  No facial asymmetry.  Hearing intact to finger rub bilaterally.  Tongue, uvula and palate midline.  Sternocleidomastoid and Trapezius intact bilaterally.    	Motor:   	Tone: normal.  	Strength:  0/5 in the RUE proximally, though will keep it up with passive initial assist to raise, has trace finger movements in right hand, 1/5 in the RLE, giveway weakness, when arm  lifted above her head it drops to her side, no effort on bedside testing, but able to stand on leg.  Right sided tone nl. 5/5 in the LUE and LLE,  	Dysmetria: None to finger-nose-finger on the left  	No truncal ataxia.    	Tremor: No resting, postural or action tremor.  No myoclonus.    	Sensation: intact to temp throughout, inconsistent to LT and pinprick throughout    	Deep Tendon Reflexes:               Biceps        BR        Patellar        Ankle       Babinski  	                                          R        3+             3+        1+               1+           mute                                              L         3+             3+        1+               1+           down  gait: Able to stand with 2 person assistance. Able to take a few steps, antalgic      back : llb trigger point tenderness    Labs    Sedimentation Rate, Erythrocyte (04.20.18 @ 13:39)    Sedimentation Rate, Erythrocyte: 17 mm/hr      Creatine Kinase, Serum (04.19.18 @ 13:11)    Creatine Kinase, Serum: 60 U/L      Imaging    < from: Xray Hip 2-3 Views, Left (04.18.18 @ 11:15) >  FINDINGS/  IMPRESSION:    There is no acute fracture or dislocation of the left hip. Normal bone   mineralization and osseous trabecular pattern. The joint spaces are   maintained.     < end of copied text >      Unremarkable CTA of the head and neck. No evidence for carotid or   vertebral dissection.    MRI of the brain was performed using sagittal T1, axial SPGR, T2, FLAIR   diffusion and susceptibility weighted sequence.The lateral ventricles have a normal configuration and There is no evidence of acute hemorrhage, mass mass effect or abnormal signal in posterior fossa supratentorial regioned.  Impression: No evidence of acute hemorrhage, mass or mass effect.    	  < from: MR Cervical Spine No Cont (04.15.18 @ 20:51) >  C2-3: Disc bulge is identified. There is abnormal T2 prolongation   associated this disc bulge which could be compatible with underlying   annular tear. Clinical correlation continued close interval follow-up is   recommended.    < from: MR Hip No Cont, Left (04.22.18 @ 19:39) >  There is no hip or pelvic fracture identified. There is no bone marrow   edema.    The bilateral hip joint spaces are preserved. There is a physiologic   amount of joint fluid.    The abductors, adductors, iliopsoas and hamstring muscles and tendons are   normal.    Uterine fibroids are present. There is a small amount of pelvic free   fluid which is normal for a patient of this age.    The peripheral soft tissues are normal. The neurovascular structures are   intact.

## 2018-04-23 NOTE — PROGRESS NOTE ADULT - ASSESSMENT
Pt is a 38 yo F with a PMH of Migraine and C-Spine Fusion (s/p fall) who is now presenting as a transfer from [facility] after developing right sided weakness 2/2 fall down stairs. On exam pt has significant right sided weakness which is possibly effort dependent. MRI brain and MRA C spine are negative for any pathology. rEEG negative for seizure events. Normal CK.    Right sided hemiparesis s/p fall,exam appears to be functional  Left hip pain, xray negative  MRI negative  Nurse reports pt moves her right arm when being cleaned.   Evaluated by psychiatry and they do not feel she meets diagnostic criteria for conversion disorder.   Orthopedics assessing hip pain as well. Pt is a 38 yo F with a PMH of Migraine and C-Spine Fusion (s/p fall) who is now presenting as a transfer from [facility] after developing right sided weakness 2/2 fall down stairs. On exam pt has significant right sided weakness which is possibly effort dependent. MRI brain and MRA C spine are negative for any pathology. rEEG negative for seizure events. Normal CK.    Right sided hemiparesis s/p fall,exam appears to be functional  Left hip pain, xray negative  MRI negative  Nurse reports pt moves her right arm when being cleaned.   Evaluated by psychiatry and they do not feel she meets diagnostic criteria for conversion disorder.   Orthopedics assessing hip pain as well. No clear etiology at this time. 36 yo F with a PMH of Migraine and C-Spine Fusion for stenosis (s/p fall aroudn 2011) who is now presenting as a transfer from [facility] after developing right sided weakness 2/2 fall down stairs. On exam pt has significant right sided weakness which appears somewhat functional in nature. MRI brain and MRA C spine are negative for any new pathology, some chronic DJD, prior fusion, with some adjacent cord sign abn apparent on MRI neck.  No current stenosis/compression.   REEG negative for seizure events. Normal CK.    Spinal stenosis:  Right sided hemiparesis s/p fall,exam appears to be at least partly functional in nature.  Cord pathology/disc protrusion at C4 makes spinal apraxis p fall a consideration, but time course seems unlikely, and exam is not consistent.  Nurse reports pt moves her right arm when being cleaned. Pt able to stand on her legs, though bedside testing is devoid of movement on right.  Evaluated by psychiatry and they do not feel she meets diagnostic criteria for conversion disorder.     Hip strain p fall:  Left hip pain, xray negative. MRI negative of head, hip.  Orthopedics assessing hip pain as well. No operative etiology at this time.  ?Strain/soft tissue injury p fall.    analgesia transition to oral agents, limit sedating/addictive options.  rehab, dispo

## 2018-04-23 NOTE — PROGRESS NOTE ADULT - PROBLEM SELECTOR PLAN 1
Pain control, Increased Tramadol  25mg to q6hrs for breakthrough, Celecoxib 200mg daily with GI prophylaxis. consider muscle relaxant   PM&R recommending home with outpatient PT. Patient feels she needs LENNIE.  Ortho recommendations appreciated, will follow up after MRI. Likely no hip pathology and ESR decreasing.

## 2018-04-24 PROCEDURE — 99232 SBSQ HOSP IP/OBS MODERATE 35: CPT

## 2018-04-24 RX ADMIN — Medication 50 MILLIGRAM(S): at 18:17

## 2018-04-24 RX ADMIN — TRAMADOL HYDROCHLORIDE 25 MILLIGRAM(S): 50 TABLET ORAL at 00:15

## 2018-04-24 RX ADMIN — CELECOXIB 200 MILLIGRAM(S): 200 CAPSULE ORAL at 11:22

## 2018-04-24 RX ADMIN — TRAMADOL HYDROCHLORIDE 25 MILLIGRAM(S): 50 TABLET ORAL at 19:38

## 2018-04-24 RX ADMIN — TRAMADOL HYDROCHLORIDE 25 MILLIGRAM(S): 50 TABLET ORAL at 07:00

## 2018-04-24 RX ADMIN — PANTOPRAZOLE SODIUM 40 MILLIGRAM(S): 20 TABLET, DELAYED RELEASE ORAL at 05:50

## 2018-04-24 RX ADMIN — TRAMADOL HYDROCHLORIDE 25 MILLIGRAM(S): 50 TABLET ORAL at 20:15

## 2018-04-24 RX ADMIN — CELECOXIB 200 MILLIGRAM(S): 200 CAPSULE ORAL at 12:30

## 2018-04-24 RX ADMIN — ENOXAPARIN SODIUM 40 MILLIGRAM(S): 100 INJECTION SUBCUTANEOUS at 18:17

## 2018-04-24 RX ADMIN — BENZOCAINE AND MENTHOL 1 LOZENGE: 5; 1 LIQUID ORAL at 13:36

## 2018-04-24 RX ADMIN — TRAMADOL HYDROCHLORIDE 25 MILLIGRAM(S): 50 TABLET ORAL at 05:50

## 2018-04-24 RX ADMIN — TRAMADOL HYDROCHLORIDE 25 MILLIGRAM(S): 50 TABLET ORAL at 13:36

## 2018-04-24 RX ADMIN — BENZOCAINE AND MENTHOL 1 LOZENGE: 5; 1 LIQUID ORAL at 11:23

## 2018-04-24 RX ADMIN — Medication 1000 MILLIGRAM(S): at 11:22

## 2018-04-24 RX ADMIN — TRAMADOL HYDROCHLORIDE 25 MILLIGRAM(S): 50 TABLET ORAL at 14:15

## 2018-04-24 RX ADMIN — Medication 50 MILLIGRAM(S): at 05:50

## 2018-04-24 NOTE — PROGRESS NOTE ADULT - PROBLEM SELECTOR PLAN 1
Pain control, Increased Tramadol  25mg to q6hrs for breakthrough, Celecoxib 200mg daily with GI prophylaxis. consider muscle relaxant   PM&R recommending home with outpatient PT. Patient feels she needs LENNIE.  Ortho recommendations appreciated, will follow up after MRI. Likely no hip pathology and ESR decreasing. Pain control, Increased Tramadol  25mg to q6hrs for breakthrough, Celecoxib 200mg daily with GI prophylaxis. consider muscle relaxant   Ortho recommendations appreciated, will follow up after MRI. Likely no hip pathology and ESR decreasing.  Dispo plan to acute rehab, CM/SW aware

## 2018-04-24 NOTE — PROGRESS NOTE ADULT - SUBJECTIVE AND OBJECTIVE BOX
Neurology Consult Note    Interval history: Patient still complaining of pain, particularly in left lower back/hip at times moving down to left knee.  lancinating at times, sore.  OOB with assist and with rehab over weekend    MEDICATIONS  (STANDING):  celecoxib 200 milliGRAM(s) Oral daily  enoxaparin Injectable 40 milliGRAM(s) SubCutaneous every 24 hours  influenza   Vaccine 0.5 milliLiter(s) IntraMuscular once  pantoprazole    Tablet 40 milliGRAM(s) Oral before breakfast  topiramate 50 milliGRAM(s) Oral two times a day    MEDICATIONS  (PRN):  acetaminophen   Tablet 1000 milliGRAM(s) Oral every 8 hours PRN severe pain  benzocaine 15 mG/menthol 3.6 mG Lozenge 1 Lozenge Oral every 2 hours PRN Sore Throat  traMADol 25 milliGRAM(s) Oral every 6 hours PRN Moderate Pain (4 - 6)    Vital Signs Last 24 Hrs  T(C): 36.9 (24 Apr 2018 07:31), Max: 36.9 (23 Apr 2018 20:45)  T(F): 98.4 (24 Apr 2018 07:31), Max: 98.4 (23 Apr 2018 20:45)  HR: 81 (24 Apr 2018 07:31) (70 - 81)  BP: 102/64 (24 Apr 2018 07:31) (92/56 - 108/69)  BP(mean): --  RR: 18 (24 Apr 2018 07:31) (18 - 18)  SpO2: 100% (24 Apr 2018 07:31) (98% - 100%)    	Neurological Exam:  	Mental Status: Orientated to self, date and place.  Attention intact.  No dysarthria. Speech fluent.  	Cranial Nerves: PERRL, EOMI, VFF, no nystagmus.    CN V1-3 intact to light touch b/l.  No facial asymmetry.  Hearing intact to finger rub bilaterally.  Tongue, uvula and palate midline.  Sternocleidomastoid and Trapezius intact bilaterally.    	Motor:   	Tone: normal.  	Strength:  0/5 in the RUE proximally, though will keep it up with passive initial assist to raise, has trace finger movements in right hand, 1/5 in the RLE, giveway weakness, when arm  lifted above her head it drops to her side, no effort on bedside testing, but able to stand on leg.  Right sided tone nl. 5/5 in the LUE and LLE,  	Dysmetria: None to finger-nose-finger on the left  	No truncal ataxia.    	Tremor: No resting, postural or action tremor.  No myoclonus.    	Sensation: intact to temp throughout, inconsistent to LT and pinprick throughout    	Deep Tendon Reflexes:               Biceps        BR        Patellar        Ankle       Babinski  	                                          R        3+             3+        1+               1+           mute                                              L         3+             3+        1+               1+           down  gait: Able to stand with 2 person assistance. Able to take a few steps, antalgic      back : llb trigger point tenderness    Labs    Sedimentation Rate, Erythrocyte (04.20.18 @ 13:39)    Sedimentation Rate, Erythrocyte: 17 mm/hr      Creatine Kinase, Serum (04.19.18 @ 13:11)    Creatine Kinase, Serum: 60 U/L      Imaging    < from: Xray Hip 2-3 Views, Left (04.18.18 @ 11:15) >  FINDINGS/  IMPRESSION:    There is no acute fracture or dislocation of the left hip. Normal bone   mineralization and osseous trabecular pattern. The joint spaces are   maintained.     < end of copied text >      Unremarkable CTA of the head and neck. No evidence for carotid or   vertebral dissection.    MRI of the brain was performed using sagittal T1, axial SPGR, T2, FLAIR   diffusion and susceptibility weighted sequence.The lateral ventricles have a normal configuration and There is no evidence of acute hemorrhage, mass mass effect or abnormal signal in posterior fossa supratentorial regioned.  Impression: No evidence of acute hemorrhage, mass or mass effect.    	  < from: MR Cervical Spine No Cont (04.15.18 @ 20:51) >  C2-3: Disc bulge is identified. There is abnormal T2 prolongation   associated this disc bulge which could be compatible with underlying   annular tear. Clinical correlation continued close interval follow-up is   recommended.    < from: MR Hip No Cont, Left (04.22.18 @ 19:39) >  There is no hip or pelvic fracture identified. There is no bone marrow   edema.    The bilateral hip joint spaces are preserved. There is a physiologic   amount of joint fluid.    The abductors, adductors, iliopsoas and hamstring muscles and tendons are   normal.    Uterine fibroids are present. There is a small amount of pelvic free   fluid which is normal for a patient of this age.    The peripheral soft tissues are normal. The neurovascular structures are   intact. Neurology Consult Note    Interval history: Patient still complaining of pain, particularly in "bottom of her left lower back/hip.     MEDICATIONS  (STANDING):  celecoxib 200 milliGRAM(s) Oral daily  enoxaparin Injectable 40 milliGRAM(s) SubCutaneous every 24 hours  influenza   Vaccine 0.5 milliLiter(s) IntraMuscular once  pantoprazole    Tablet 40 milliGRAM(s) Oral before breakfast  topiramate 50 milliGRAM(s) Oral two times a day    MEDICATIONS  (PRN):  acetaminophen   Tablet 1000 milliGRAM(s) Oral every 8 hours PRN severe pain  benzocaine 15 mG/menthol 3.6 mG Lozenge 1 Lozenge Oral every 2 hours PRN Sore Throat  traMADol 25 milliGRAM(s) Oral every 6 hours PRN Moderate Pain (4 - 6)    Vital Signs Last 24 Hrs  T(C): 36.9 (24 Apr 2018 07:31), Max: 36.9 (23 Apr 2018 20:45)  T(F): 98.4 (24 Apr 2018 07:31), Max: 98.4 (23 Apr 2018 20:45)  HR: 81 (24 Apr 2018 07:31) (70 - 81)  BP: 102/64 (24 Apr 2018 07:31) (92/56 - 108/69)  BP(mean): --  RR: 18 (24 Apr 2018 07:31) (18 - 18)  SpO2: 100% (24 Apr 2018 07:31) (98% - 100%)    	Neurological Exam:  	Mental Status: Orientated to self, date and place.  Attention intact.  No dysarthria. Speech fluent.  	Cranial Nerves: PERRL, EOMI, VFF, no nystagmus.    CN V1-3 intact to light touch b/l.  No facial asymmetry.  Hearing intact to finger rub bilaterally.  Tongue, uvula and palate midline.  Sternocleidomastoid and Trapezius intact bilaterally.    	Motor:   	Tone: normal.  	Strength:  0/5 in the RUE proximally, though will keep it up with passive initial assist to raise, has trace finger movements in right hand, 1/5 in the RLE, giveway weakness, when arm  lifted above her head it drops to her side, no effort on bedside testing, but able to stand on leg.  Right sided tone nl. 5/5 in the LUE and LLE,  	Dysmetria: None to finger-nose-finger on the left  	No truncal ataxia.    	Tremor: No resting, postural or action tremor.  No myoclonus.    	Sensation: intact to temp throughout, inconsistent to LT and pinprick throughout    	Deep Tendon Reflexes:               Biceps        BR        Patellar        Ankle       Babinski  	                                          R        3+             3+        1+               1+           mute                                              L         3+             3+        1+               1+           down  gait: Able to stand with 2 person assistance. Able to take a few steps, antalgic      back : llb trigger point tenderness    Labs    Sedimentation Rate, Erythrocyte (04.20.18 @ 13:39)    Sedimentation Rate, Erythrocyte: 17 mm/hr      Creatine Kinase, Serum (04.19.18 @ 13:11)    Creatine Kinase, Serum: 60 U/L      Imaging    < from: Xray Hip 2-3 Views, Left (04.18.18 @ 11:15) >  FINDINGS/  IMPRESSION:    There is no acute fracture or dislocation of the left hip. Normal bone   mineralization and osseous trabecular pattern. The joint spaces are   maintained.     < end of copied text >      Unremarkable CTA of the head and neck. No evidence for carotid or   vertebral dissection.    MRI of the brain was performed using sagittal T1, axial SPGR, T2, FLAIR   diffusion and susceptibility weighted sequence.The lateral ventricles have a normal configuration and There is no evidence of acute hemorrhage, mass mass effect or abnormal signal in posterior fossa supratentorial regioned.  Impression: No evidence of acute hemorrhage, mass or mass effect.    	  < from: MR Cervical Spine No Cont (04.15.18 @ 20:51) >  C2-3: Disc bulge is identified. There is abnormal T2 prolongation   associated this disc bulge which could be compatible with underlying   annular tear. Clinical correlation continued close interval follow-up is   recommended.    < from: MR Hip No Cont, Left (04.22.18 @ 19:39) >  There is no hip or pelvic fracture identified. There is no bone marrow   edema.    The bilateral hip joint spaces are preserved. There is a physiologic   amount of joint fluid.    The abductors, adductors, iliopsoas and hamstring muscles and tendons are   normal.    Uterine fibroids are present. There is a small amount of pelvic free   fluid which is normal for a patient of this age.    The peripheral soft tissues are normal. The neurovascular structures are   intact. Neurology Consult Note    Interval history: Patient still complaining of pain, particularly in "bottom of her left lower back/hip, with sitting too long."     MEDICATIONS  (STANDING):  celecoxib 200 milliGRAM(s) Oral daily  enoxaparin Injectable 40 milliGRAM(s) SubCutaneous every 24 hours  influenza   Vaccine 0.5 milliLiter(s) IntraMuscular once  pantoprazole    Tablet 40 milliGRAM(s) Oral before breakfast  topiramate 50 milliGRAM(s) Oral two times a day    MEDICATIONS  (PRN):  acetaminophen   Tablet 1000 milliGRAM(s) Oral every 8 hours PRN severe pain  benzocaine 15 mG/menthol 3.6 mG Lozenge 1 Lozenge Oral every 2 hours PRN Sore Throat  traMADol 25 milliGRAM(s) Oral every 6 hours PRN Moderate Pain (4 - 6)    Vital Signs Last 24 Hrs  T(C): 36.9 (24 Apr 2018 07:31), Max: 36.9 (23 Apr 2018 20:45)  T(F): 98.4 (24 Apr 2018 07:31), Max: 98.4 (23 Apr 2018 20:45)  HR: 81 (24 Apr 2018 07:31) (70 - 81)  BP: 102/64 (24 Apr 2018 07:31) (92/56 - 108/69)  BP(mean): --  RR: 18 (24 Apr 2018 07:31) (18 - 18)  SpO2: 100% (24 Apr 2018 07:31) (98% - 100%)    	Neurological Exam:  	Mental Status: Orientated to self, date and place.  Attention intact.  No dysarthria. Speech fluent.  	Cranial Nerves: PERRL, EOMI, VFF, no nystagmus.    CN V1-3 intact to light touch b/l.  No facial asymmetry.  Hearing intact to finger rub bilaterally.  Tongue, uvula and palate midline.  Sternocleidomastoid and Trapezius intact bilaterally.    	Motor:   	Tone: normal.  	Strength:  2-3/5 in the RUE proximally, no effort at rest, but will keep it up with passive initial assist to raise, has trace finger movements in right hand, 1/5 in the RLE, giveway weakness, when arm  lifted above her head it drops to her side, no effort on bedside testing, but able to sustain posture at least 2-3/5, and stand on leg.  Right sided tone nl. 5/5 in the LUE and LLE,  	Dysmetria: None to finger-nose-finger on the left  	No truncal ataxia.    	Tremor: No resting, postural or action tremor.  No myoclonus.    	Sensation: intact to temp throughout, inconsistent to LT and pinprick throughout    	Deep Tendon Reflexes:               Biceps        BR        Patellar        Ankle       Babinski  	                                          R        3+             3+        1+               1+           mute                                              L         3+             3+        1+               1+           down  gait: Able to stand with 2 person assistance. Able to take a few steps, antalgic      back : llb trigger point tenderness    Labs    Sedimentation Rate, Erythrocyte (04.20.18 @ 13:39)    Sedimentation Rate, Erythrocyte: 17 mm/hr      Creatine Kinase, Serum (04.19.18 @ 13:11)    Creatine Kinase, Serum: 60 U/L      Imaging    < from: Xray Hip 2-3 Views, Left (04.18.18 @ 11:15) >  FINDINGS/  IMPRESSION:    There is no acute fracture or dislocation of the left hip. Normal bone   mineralization and osseous trabecular pattern. The joint spaces are   maintained.     < end of copied text >      Unremarkable CTA of the head and neck. No evidence for carotid or   vertebral dissection.    MRI of the brain was performed using sagittal T1, axial SPGR, T2, FLAIR   diffusion and susceptibility weighted sequence.The lateral ventricles have a normal configuration and There is no evidence of acute hemorrhage, mass mass effect or abnormal signal in posterior fossa supratentorial regioned.  Impression: No evidence of acute hemorrhage, mass or mass effect.    	  < from: MR Cervical Spine No Cont (04.15.18 @ 20:51) >  C2-3: Disc bulge is identified. There is abnormal T2 prolongation   associated this disc bulge which could be compatible with underlying   annular tear. Clinical correlation continued close interval follow-up is   recommended.    < from: MR Hip No Cont, Left (04.22.18 @ 19:39) >  There is no hip or pelvic fracture identified. There is no bone marrow   edema.    The bilateral hip joint spaces are preserved. There is a physiologic   amount of joint fluid.    The abductors, adductors, iliopsoas and hamstring muscles and tendons are   normal.    Uterine fibroids are present. There is a small amount of pelvic free   fluid which is normal for a patient of this age.    The peripheral soft tissues are normal. The neurovascular structures are   intact.

## 2018-04-24 NOTE — PROGRESS NOTE ADULT - ASSESSMENT
38 yo F with a PMH of Migraine and C-Spine Fusion for stenosis (s/p fall aroudn 2011) who is now presenting as a transfer from [facility] after developing right sided weakness 2/2 fall down stairs. On exam pt has significant right sided weakness which appears somewhat functional in nature. MRI brain and MRA C spine are negative for any new pathology, some chronic DJD, prior fusion, with some adjacent cord sign abn apparent on MRI neck.  No current stenosis/compression.   REEG negative for seizure events. Normal CK.    Spinal stenosis:  Right sided hemiparesis s/p fall,exam appears to be at least partly functional in nature.  Cord pathology/disc protrusion at C4 makes spinal apraxis p fall a consideration, but time course seems unlikely, and exam is not consistent.  Nurse reports pt moves her right arm when being cleaned. Pt able to stand on her legs, though bedside testing is devoid of movement on right.  Evaluated by psychiatry and they do not feel she meets diagnostic criteria for conversion disorder.     Hip strain p fall:  Left hip pain, xray negative. MRI negative of head, hip.  Orthopedics assessing hip pain as well. No operative etiology at this time.  ?Strain/soft tissue injury p fall.    analgesia transition to oral agents, limit sedating/addictive options.  rehab, dispo 36 yo F with a PMH of Migraine and C-Spine Fusion for stenosis (s/p fall aroudn 2011) who is now presenting as a transfer from [facility] after developing right sided weakness 2/2 fall down stairs. On exam pt has significant right sided weakness which appears somewhat functional in nature. MRI brain and MRA C spine are negative for any new pathology, some chronic DJD, prior fusion, with some adjacent cord sign abn apparent on MRI neck.  No current stenosis/compression.   REEG negative for seizure events. Normal CK.    Spinal stenosis:  Right sided hemiparesis s/p fall,exam appears to be at least partly functional in nature.  Cord pathology/disc protrusion at C4 makes spinal apraxis p fall a consideration, but time course seems unlikely, and exam is not consistent.  Nurse reports pt moves her right arm when being cleaned. Pt able to stand on her legs, though bedside testing is devoid of movement on right.  Evaluated by psychiatry and they do not feel she meets diagnostic criteria for conversion disorder.     Hip strain p fall:  Left hip pain, xray negative. MRI negative of head, hip.  Orthopedics assessing hip pain as well. No operative etiology at this time.  ?Strain/soft tissue injury p fall.    - analgesia transitioned to oral agents, does not require IV medications  - limit sedating/addictive options.  - Acute rehab, dispo - CM/SW on board 36 yo F with a PMH of Migraine and C-Spine Fusion for stenosis (s/p fall aroudn 2011) who is now presenting as a transfer from [facility] after developing right sided weakness 2/2 fall down stairs. On exam pt has significant right sided weakness which appears functional in nature. MRI brain and MRA C spine are negative for any new pathology, some chronic DJD, prior fusion, with some adjacent cord sign abn apparent on MRI neck.  No current stenosis/compression.   REEG negative for seizure events. Normal CK.    Spinal stenosis:  Right sided hemiparesis s/p fall, exam currently appears to be at least partly functional in nature.  Cord pathology/disc protrusion at C4 makes spinal apraxis p fall a consideration, but time course seems unlikely, and exam is not consistent.  Nurse reports pt moves her right arm when being cleaned. Pt able to stand on her legs, sustain posture, though bedside testing is devoid of movement on right.    Hip strain p fall:  Left hip pain, xray negative. MRI negative of head, hip.  Orthopedics assessing hip pain as well. No operative etiology at this time.      Evaluated by psychiatry.     - analgesia transitioned to oral agents, does not require IV medications  - limit sedating/addictive options.  - Acute rehab, dispo   - CM/SW on board   - pt/sig other agree to plan

## 2018-04-25 VITALS
SYSTOLIC BLOOD PRESSURE: 106 MMHG | HEART RATE: 82 BPM | TEMPERATURE: 97 F | OXYGEN SATURATION: 100 % | RESPIRATION RATE: 18 BRPM | DIASTOLIC BLOOD PRESSURE: 67 MMHG

## 2018-04-25 PROCEDURE — 73721 MRI JNT OF LWR EXTRE W/O DYE: CPT

## 2018-04-25 PROCEDURE — 82550 ASSAY OF CK (CPK): CPT

## 2018-04-25 PROCEDURE — 97165 OT EVAL LOW COMPLEX 30 MIN: CPT

## 2018-04-25 PROCEDURE — 86850 RBC ANTIBODY SCREEN: CPT

## 2018-04-25 PROCEDURE — 97110 THERAPEUTIC EXERCISES: CPT

## 2018-04-25 PROCEDURE — 73502 X-RAY EXAM HIP UNI 2-3 VIEWS: CPT

## 2018-04-25 PROCEDURE — 97162 PT EVAL MOD COMPLEX 30 MIN: CPT

## 2018-04-25 PROCEDURE — 93005 ELECTROCARDIOGRAM TRACING: CPT

## 2018-04-25 PROCEDURE — 72141 MRI NECK SPINE W/O DYE: CPT

## 2018-04-25 PROCEDURE — 85610 PROTHROMBIN TIME: CPT

## 2018-04-25 PROCEDURE — 86140 C-REACTIVE PROTEIN: CPT

## 2018-04-25 PROCEDURE — 99285 EMERGENCY DEPT VISIT HI MDM: CPT | Mod: 25

## 2018-04-25 PROCEDURE — 86038 ANTINUCLEAR ANTIBODIES: CPT

## 2018-04-25 PROCEDURE — 85730 THROMBOPLASTIN TIME PARTIAL: CPT

## 2018-04-25 PROCEDURE — 97116 GAIT TRAINING THERAPY: CPT

## 2018-04-25 PROCEDURE — 80053 COMPREHEN METABOLIC PANEL: CPT

## 2018-04-25 PROCEDURE — 86900 BLOOD TYPING SEROLOGIC ABO: CPT

## 2018-04-25 PROCEDURE — 85027 COMPLETE CBC AUTOMATED: CPT

## 2018-04-25 PROCEDURE — 86901 BLOOD TYPING SEROLOGIC RH(D): CPT

## 2018-04-25 PROCEDURE — 97530 THERAPEUTIC ACTIVITIES: CPT

## 2018-04-25 PROCEDURE — 70551 MRI BRAIN STEM W/O DYE: CPT

## 2018-04-25 PROCEDURE — 99238 HOSP IP/OBS DSCHRG MGMT 30/<: CPT

## 2018-04-25 PROCEDURE — 71045 X-RAY EXAM CHEST 1 VIEW: CPT

## 2018-04-25 PROCEDURE — 85652 RBC SED RATE AUTOMATED: CPT

## 2018-04-25 PROCEDURE — 70496 CT ANGIOGRAPHY HEAD: CPT

## 2018-04-25 PROCEDURE — 95819 EEG AWAKE AND ASLEEP: CPT

## 2018-04-25 PROCEDURE — 96374 THER/PROPH/DIAG INJ IV PUSH: CPT | Mod: XU

## 2018-04-25 PROCEDURE — 70498 CT ANGIOGRAPHY NECK: CPT

## 2018-04-25 RX ORDER — TOPIRAMATE 25 MG
0 TABLET ORAL
Qty: 0 | Refills: 0 | COMMUNITY

## 2018-04-25 RX ORDER — BENZOCAINE AND MENTHOL 5; 1 G/100ML; G/100ML
0 LIQUID ORAL
Qty: 0 | Refills: 0 | COMMUNITY
Start: 2018-04-25

## 2018-04-25 RX ORDER — CELECOXIB 200 MG/1
1 CAPSULE ORAL
Qty: 0 | Refills: 0 | COMMUNITY
Start: 2018-04-25

## 2018-04-25 RX ORDER — TRAMADOL HYDROCHLORIDE 50 MG/1
0.5 TABLET ORAL
Qty: 0 | Refills: 0 | COMMUNITY
Start: 2018-04-25

## 2018-04-25 RX ORDER — TOPIRAMATE 25 MG
1 TABLET ORAL
Qty: 0 | Refills: 0 | COMMUNITY
Start: 2018-04-25

## 2018-04-25 RX ADMIN — TRAMADOL HYDROCHLORIDE 25 MILLIGRAM(S): 50 TABLET ORAL at 07:52

## 2018-04-25 RX ADMIN — CELECOXIB 200 MILLIGRAM(S): 200 CAPSULE ORAL at 13:24

## 2018-04-25 RX ADMIN — TRAMADOL HYDROCHLORIDE 25 MILLIGRAM(S): 50 TABLET ORAL at 01:43

## 2018-04-25 RX ADMIN — TRAMADOL HYDROCHLORIDE 25 MILLIGRAM(S): 50 TABLET ORAL at 14:24

## 2018-04-25 RX ADMIN — TRAMADOL HYDROCHLORIDE 25 MILLIGRAM(S): 50 TABLET ORAL at 02:30

## 2018-04-25 RX ADMIN — Medication 50 MILLIGRAM(S): at 05:45

## 2018-04-25 RX ADMIN — TRAMADOL HYDROCHLORIDE 25 MILLIGRAM(S): 50 TABLET ORAL at 08:30

## 2018-04-25 RX ADMIN — PANTOPRAZOLE SODIUM 40 MILLIGRAM(S): 20 TABLET, DELAYED RELEASE ORAL at 05:45

## 2018-04-25 RX ADMIN — CELECOXIB 200 MILLIGRAM(S): 200 CAPSULE ORAL at 12:00

## 2018-04-25 NOTE — PROGRESS NOTE ADULT - SUBJECTIVE AND OBJECTIVE BOX
Neurology Consult Note    Interval history: Patient reports feeling much better as far as pain and weakness    MEDICATIONS  (STANDING):  celecoxib 200 milliGRAM(s) Oral daily  enoxaparin Injectable 40 milliGRAM(s) SubCutaneous every 24 hours  influenza   Vaccine 0.5 milliLiter(s) IntraMuscular once  pantoprazole    Tablet 40 milliGRAM(s) Oral before breakfast  topiramate 50 milliGRAM(s) Oral two times a day    MEDICATIONS  (PRN):  acetaminophen   Tablet 1000 milliGRAM(s) Oral every 8 hours PRN severe pain  benzocaine 15 mG/menthol 3.6 mG Lozenge 1 Lozenge Oral every 2 hours PRN Sore Throat  traMADol 25 milliGRAM(s) Oral every 6 hours PRN Moderate Pain (4 - 6)    Vital Signs Last 24 Hrs  T(C): 36.8 (25 Apr 2018 08:07), Max: 36.8 (24 Apr 2018 12:26)  T(F): 98.2 (25 Apr 2018 08:07), Max: 98.2 (24 Apr 2018 12:26)  HR: 79 (25 Apr 2018 08:07) (63 - 89)  BP: 88/56 (25 Apr 2018 08:07) (88/56 - 108/70)  BP(mean): --  RR: 18 (25 Apr 2018 08:07) (18 - 18)  SpO2: 100% (25 Apr 2018 08:07) (98% - 100%)    	Neurological Exam:  	Mental Status: Orientated to self, date and place.  Attention intact.  No dysarthria. Speech fluent.  	Cranial Nerves: PERRL, EOMI, VFF, no nystagmus.    CN V1-3 intact to light touch b/l.  No facial asymmetry.  Hearing intact to finger rub bilaterally.  Tongue, uvula and palate midline.  Sternocleidomastoid and Trapezius intact bilaterally.    	Motor:   	Tone: normal.  	Strength:  2-3/5 in the RUE proximally, no effort at rest, but will keep it up with passive initial assist to raise, has trace finger movements in right hand, 1/5 in the RLE, giveway weakness, when arm  lifted above her head it drops to her side, no effort on bedside testing, but able to sustain posture at least 2-3/5, and stand on leg.  Right sided tone nl. 5/5 in the LUE and LLE,  	Dysmetria: None to finger-nose-finger on the left  	No truncal ataxia.    	Tremor: No resting, postural or action tremor.  No myoclonus.    	Sensation: intact to temp throughout, inconsistent to LT and pinprick throughout    	Deep Tendon Reflexes:               Biceps        BR        Patellar        Ankle       Babinski  	                                          R        3+             3+        1+               1+           mute                                              L         3+             3+        1+               1+           down  gait: Able to stand with 2 person assistance. Able to take a few steps, antalgic      back : llb trigger point tenderness    Labs    Sedimentation Rate, Erythrocyte (04.20.18 @ 13:39)    Sedimentation Rate, Erythrocyte: 17 mm/hr      Creatine Kinase, Serum (04.19.18 @ 13:11)    Creatine Kinase, Serum: 60 U/L      Imaging    < from: Xray Hip 2-3 Views, Left (04.18.18 @ 11:15) >  FINDINGS/  IMPRESSION:    There is no acute fracture or dislocation of the left hip. Normal bone   mineralization and osseous trabecular pattern. The joint spaces are   maintained.     < end of copied text >      Unremarkable CTA of the head and neck. No evidence for carotid or   vertebral dissection.    MRI of the brain was performed using sagittal T1, axial SPGR, T2, FLAIR   diffusion and susceptibility weighted sequence.The lateral ventricles have a normal configuration and There is no evidence of acute hemorrhage, mass mass effect or abnormal signal in posterior fossa supratentorial regioned.  Impression: No evidence of acute hemorrhage, mass or mass effect.    	  < from: MR Cervical Spine No Cont (04.15.18 @ 20:51) >  C2-3: Disc bulge is identified. There is abnormal T2 prolongation   associated this disc bulge which could be compatible with underlying   annular tear. Clinical correlation continued close interval follow-up is   recommended.    < from: MR Hip No Cont, Left (04.22.18 @ 19:39) >  There is no hip or pelvic fracture identified. There is no bone marrow   edema.    The bilateral hip joint spaces are preserved. There is a physiologic   amount of joint fluid.    The abductors, adductors, iliopsoas and hamstring muscles and tendons are   normal.    Uterine fibroids are present. There is a small amount of pelvic free   fluid which is normal for a patient of this age.    The peripheral soft tissues are normal. The neurovascular structures are   intact. Neurology Consult Note    Interval history: Patient reports feeling much better as far as pain and weakness    MEDICATIONS  (STANDING):  celecoxib 200 milliGRAM(s) Oral daily  enoxaparin Injectable 40 milliGRAM(s) SubCutaneous every 24 hours  influenza   Vaccine 0.5 milliLiter(s) IntraMuscular once  pantoprazole    Tablet 40 milliGRAM(s) Oral before breakfast  topiramate 50 milliGRAM(s) Oral two times a day    MEDICATIONS  (PRN):  acetaminophen   Tablet 1000 milliGRAM(s) Oral every 8 hours PRN severe pain  benzocaine 15 mG/menthol 3.6 mG Lozenge 1 Lozenge Oral every 2 hours PRN Sore Throat  traMADol 25 milliGRAM(s) Oral every 6 hours PRN Moderate Pain (4 - 6)    Vital Signs Last 24 Hrs  T(C): 36.8 (25 Apr 2018 08:07), Max: 36.8 (24 Apr 2018 12:26)  T(F): 98.2 (25 Apr 2018 08:07), Max: 98.2 (24 Apr 2018 12:26)  HR: 79 (25 Apr 2018 08:07) (63 - 89)  BP: 88/56 (25 Apr 2018 08:07) (88/56 - 108/70)  BP(mean): --  RR: 18 (25 Apr 2018 08:07) (18 - 18)  SpO2: 100% (25 Apr 2018 08:07) (98% - 100%)    	Neurological Exam:  	Mental Status: Orientated to self, date and place.  Attention intact.  No dysarthria. Speech fluent.  	Cranial Nerves: EOMI, VFF, no nystagmus. Mild decrease im light touch sensation on the right side of the face including forehead.  No facial asymmetry.      	Motor:   	Tone: normal.  	Strength:  2-3/5 in the RUE proximally, no effort at rest, but will keep it up with passive initial assist to raise, has trace finger movements in right hand, 1/5 in the RLE, giveway weakness, when arm  lifted above her head it drops to her side, no effort on bedside testing, but able to sustain posture at least 2-3/5, and stand on leg.  Right sided tone nl. 5/5 in the LUE and LLE,  	Dysmetria: None to finger-nose-finger on the left  	No truncal ataxia.    	Tremor: No resting, postural or action tremor.  No myoclonus.    	Sensation: intact to temp throughout, inconsistent to LT and pinprick throughout, but possibly decreased pin prick and light touch on the right side throughout.    	Deep Tendon Reflexes:               Biceps        BR        Patellar        Ankle       Babinski  	                                          R        3+             3+        1+               1+           mute                                              L         3+             3+        1+               1+           down  gait: Able to stand with 2 person assistance. Able to take a few steps, antalgic      back : llb trigger point tenderness    Labs    Sedimentation Rate, Erythrocyte (04.20.18 @ 13:39)    Sedimentation Rate, Erythrocyte: 17 mm/hr      Creatine Kinase, Serum (04.19.18 @ 13:11)    Creatine Kinase, Serum: 60 U/L      Imaging    < from: Xray Hip 2-3 Views, Left (04.18.18 @ 11:15) >  FINDINGS/  IMPRESSION:    There is no acute fracture or dislocation of the left hip. Normal bone   mineralization and osseous trabecular pattern. The joint spaces are   maintained.     < end of copied text >      Unremarkable CTA of the head and neck. No evidence for carotid or   vertebral dissection.    MRI of the brain was performed using sagittal T1, axial SPGR, T2, FLAIR   diffusion and susceptibility weighted sequence.The lateral ventricles have a normal configuration and There is no evidence of acute hemorrhage, mass mass effect or abnormal signal in posterior fossa supratentorial regioned.  Impression: No evidence of acute hemorrhage, mass or mass effect.    	  < from: MR Cervical Spine No Cont (04.15.18 @ 20:51) >  C2-3: Disc bulge is identified. There is abnormal T2 prolongation   associated this disc bulge which could be compatible with underlying   annular tear. Clinical correlation continued close interval follow-up is   recommended.    < from: MR Hip No Cont, Left (04.22.18 @ 19:39) >  There is no hip or pelvic fracture identified. There is no bone marrow   edema.    The bilateral hip joint spaces are preserved. There is a physiologic   amount of joint fluid.    The abductors, adductors, iliopsoas and hamstring muscles and tendons are   normal.    Uterine fibroids are present. There is a small amount of pelvic free   fluid which is normal for a patient of this age.    The peripheral soft tissues are normal. The neurovascular structures are   intact. Neurology Consult Note    Interval history: Patient reports feeling much better as far as pain and weakness    MEDICATIONS  (STANDING):  celecoxib 200 milliGRAM(s) Oral daily  enoxaparin Injectable 40 milliGRAM(s) SubCutaneous every 24 hours  influenza   Vaccine 0.5 milliLiter(s) IntraMuscular once  pantoprazole    Tablet 40 milliGRAM(s) Oral before breakfast  topiramate 50 milliGRAM(s) Oral two times a day    MEDICATIONS  (PRN):  acetaminophen   Tablet 1000 milliGRAM(s) Oral every 8 hours PRN severe pain  benzocaine 15 mG/menthol 3.6 mG Lozenge 1 Lozenge Oral every 2 hours PRN Sore Throat  traMADol 25 milliGRAM(s) Oral every 6 hours PRN Moderate Pain (4 - 6)    Vital Signs Last 24 Hrs  T(C): 36.8 (25 Apr 2018 08:07), Max: 36.8 (24 Apr 2018 12:26)  T(F): 98.2 (25 Apr 2018 08:07), Max: 98.2 (24 Apr 2018 12:26)  HR: 79 (25 Apr 2018 08:07) (63 - 89)  BP: 88/56 (25 Apr 2018 08:07) (88/56 - 108/70)  BP(mean): --  RR: 18 (25 Apr 2018 08:07) (18 - 18)  SpO2: 100% (25 Apr 2018 08:07) (98% - 100%)    	Neurological Exam:  	Mental Status: Orientated to self, date and place.  Attention intact.  No dysarthria. Speech fluent.  	Cranial Nerves: EOMI, VFF, no nystagmus. Mild decrease im light touch sensation on the right side of the face including forehead (splits midline exactly).  No facial asymmetry.      	Motor:   	Tone: normal.  	Strength:  2-3/5 in the RUE proximally, no effort at rest, but will keep it up with passive initial assist to raise, has trace finger movements in right hand, 1/5 in the RLE, giveway weakness, when arm  lifted above her head it drops to her side, no effort on bedside testing, but able to sustain posture at least 2-3/5, and stand on leg.  Right sided tone nl. 5/5 in the LUE and LLE,  	Dysmetria: None to finger-nose-finger on the left  	No truncal ataxia.    	Tremor: No resting, postural or action tremor.  No myoclonus.    	Sensation: intact to temp throughout, inconsistent to LT and pinprick throughout, but possibly decreased pin prick and light touch on the right side throughout.    	Deep Tendon Reflexes:               Biceps        BR        Patellar        Ankle       Babinski  	                                          R        3+             3+        1+               1+           mute                                              L         3+             3+        1+               1+           down  gait: Able to stand with 2 person assistance. Able to take a few steps, antalgic      back : llb trigger point tenderness    Labs    Sedimentation Rate, Erythrocyte (04.20.18 @ 13:39)    Sedimentation Rate, Erythrocyte: 17 mm/hr      Creatine Kinase, Serum (04.19.18 @ 13:11)    Creatine Kinase, Serum: 60 U/L      Imaging    < from: Xray Hip 2-3 Views, Left (04.18.18 @ 11:15) >  FINDINGS/  IMPRESSION:    There is no acute fracture or dislocation of the left hip. Normal bone   mineralization and osseous trabecular pattern. The joint spaces are   maintained.     < end of copied text >      Unremarkable CTA of the head and neck. No evidence for carotid or   vertebral dissection.    MRI of the brain was performed using sagittal T1, axial SPGR, T2, FLAIR   diffusion and susceptibility weighted sequence.The lateral ventricles have a normal configuration and There is no evidence of acute hemorrhage, mass mass effect or abnormal signal in posterior fossa supratentorial regioned.  Impression: No evidence of acute hemorrhage, mass or mass effect.    	  < from: MR Cervical Spine No Cont (04.15.18 @ 20:51) >  C2-3: Disc bulge is identified. There is abnormal T2 prolongation   associated this disc bulge which could be compatible with underlying   annular tear. Clinical correlation continued close interval follow-up is   recommended.    < from: MR Hip No Cont, Left (04.22.18 @ 19:39) >  There is no hip or pelvic fracture identified. There is no bone marrow   edema.    The bilateral hip joint spaces are preserved. There is a physiologic   amount of joint fluid.    The abductors, adductors, iliopsoas and hamstring muscles and tendons are   normal.    Uterine fibroids are present. There is a small amount of pelvic free   fluid which is normal for a patient of this age.    The peripheral soft tissues are normal. The neurovascular structures are   intact.

## 2018-04-25 NOTE — PROGRESS NOTE ADULT - ASSESSMENT
36 yo F with a PMH of Migraine and C-Spine Fusion for stenosis (s/p fall aroudn 2011) who is now presenting as a transfer from [facility] after developing right sided weakness 2/2 fall down stairs. On exam pt has significant right sided weakness which appears functional in nature. MRI brain and MRA C spine are negative for any new pathology, some chronic DJD, prior fusion, with some adjacent cord sign abn apparent on MRI neck.  No current stenosis/compression.   REEG negative for seizure events. Normal CK.    Spinal stenosis:  Right sided hemiparesis s/p fall, exam currently appears to be at least partly functional in nature.  Cord pathology/disc protrusion at C4 makes spinal apraxis p fall a consideration, but time course seems unlikely, and exam is not consistent.  Nurse reports pt moves her right arm when being cleaned. Pt able to stand on her legs, sustain posture, though bedside testing is devoid of movement on right.    Hip strain p fall:  Left hip pain, xray negative. MRI negative of head, hip.  Orthopedics assessing hip pain as well. No operative etiology at this time.      Evaluated by psychiatry.     - analgesia transitioned to oral agents, does not require IV medications  - limit sedating/addictive options.  - Acute rehab possibly today, dispo   - CM/SW on board   - pt/sig other agree to plan 36 yo F with a PMH of Migraine and C-Spine Fusion for stenosis (s/p fall aroudn 2011) who is now presenting as a transfer from [facility] after developing right sided weakness 2/2 fall down stairs. On exam pt has significant right sided weakness which appears functional in nature. MRI brain and MRA C spine are negative for any new pathology, some chronic DJD, prior fusion, with some adjacent cord sign abn apparent on MRI neck.  No current stenosis/compression.  REEG negative for seizure events. Normal CK.    Spinal stenosis:  Right sided hemiparesis s/p fall, exam currently appears to be at least partly functional in nature.  Cord pathology/disc protrusion at C4 makes spinal apraxis p fall a consideration, but time course seems unlikely, and exam is not consistent.  Nurse reports pt moves her right arm when being cleaned. Pt able to stand on her legs, sustain posture, though bedside testing is devoid of movement on right.    Hip strain p fall:  Left hip pain, xray negative. MRI negative of head, hip.  Orthopedics assessing hip pain as well. No operative etiology at this time.    Evaluated by psychiatry.     - analgesia transitioned to oral agents, does not require IV medications  - limit sedating/addictive options.  - Acute rehab possibly today, dispo   - CM/SW on board   - pt/sig other agree to plan 38 yo F with a PMH of Migraine and C-Spine Fusion for stenosis (s/p fall aroudn 2011) who is now presenting as a transfer from [facility] after developing right sided weakness 2/2 fall down stairs. On exam pt has significant right sided weakness which appears functional in nature. MRI brain and MRA C spine are negative for any new pathology, some chronic DJD, prior fusion, with some adjacent cord sign abn apparent on MRI neck.  No current stenosis/compression.  REEG negative for seizure events. Normal CK.    Spinal stenosis:  Right sided hemiparesis s/p fall, exam currently appears to be at least partly functional in nature.  Cord pathology/disc protrusion at C4 makes spinal apraxis p fall a consideration, but time course seems unlikely, and exam is not consistent.  Nurse reports pt moves her right arm when being cleaned. Pt able to stand on her legs, sustain posture, though bedside testing is devoid of movement on right.    Hip strain p fall:  Left hip pain, xray negative. MRI negative of head, hip.  Orthopedics assessing hip pain as well. No operative etiology at this time.      Evaluated by psychiatry for PTSD, concern for somatic d/o.     - analgesia transitioned to oral agents, does not require IV medications  - limit sedating/addictive options.  - Acute rehab possibly today, dispo   - CM/SW on board   - pt/sig other agree to plan

## 2018-04-25 NOTE — PROGRESS NOTE ADULT - PROBLEM SELECTOR PLAN 1
Pain control, Increased Tramadol  25mg to q6hrs for breakthrough, Celecoxib 200mg daily with GI prophylaxis.  Ortho recommendations appreciated, will follow up after MRI. Likely no hip pathology and ESR decreasing.  Dispo plan to acute rehab, CM/MARLEEN aware

## 2018-04-25 NOTE — PROGRESS NOTE ADULT - PROVIDER SPECIALTY LIST ADULT
Neurology
Orthopedics
Neurology

## 2018-04-25 NOTE — PROGRESS NOTE ADULT - PROBLEM SELECTOR PLAN 2
Seen on MRI pelvis. No intervention at this time  Pt made aware and to follow up with ob/gyn.

## 2022-10-31 NOTE — ED ADULT NURSE NOTE - PERIPHERAL VASCULAR WDL
Detail Level: Generalized
Products Recommended: Topical retinoid\\n
General Sunscreen Counseling: I recommended a broad spectrum sunscreen with a SPF of 30 or higher.  I explained that SPF 30 sunscreens block approximately 97 percent of the sun's harmful rays.  Sunscreens should be applied at least 15 minutes prior to expected sun exposure and then every 2 hours after that as long as sun exposure continues. If swimming or exercising sunscreen should be reapplied every 45 minutes to an hour after getting wet or sweating.  One ounce, or the equivalent of a shot glass full of sunscreen, is adequate to protect the skin not covered by a bathing suit. I also recommended a lip balm with a sunscreen as well. Sun protective clothing can be used in lieu of sunscreen but must be worn the entire time you are exposed to the sun's rays.
Pulses equal bilaterally, no edema present.